# Patient Record
Sex: MALE | Race: WHITE | NOT HISPANIC OR LATINO | Employment: PART TIME | URBAN - METROPOLITAN AREA
[De-identification: names, ages, dates, MRNs, and addresses within clinical notes are randomized per-mention and may not be internally consistent; named-entity substitution may affect disease eponyms.]

---

## 2021-11-10 ENCOUNTER — OFFICE VISIT (OUTPATIENT)
Dept: INTERNAL MEDICINE | Facility: CLINIC | Age: 76
End: 2021-11-10
Payer: COMMERCIAL

## 2021-11-10 ENCOUNTER — NURSE TRIAGE (OUTPATIENT)
Dept: INTERNAL MEDICINE | Facility: CLINIC | Age: 76
End: 2021-11-10

## 2021-11-10 VITALS
TEMPERATURE: 97.9 F | BODY MASS INDEX: 25.4 KG/M2 | DIASTOLIC BLOOD PRESSURE: 68 MMHG | HEIGHT: 70 IN | HEART RATE: 85 BPM | OXYGEN SATURATION: 96 % | SYSTOLIC BLOOD PRESSURE: 148 MMHG | WEIGHT: 177.4 LBS | RESPIRATION RATE: 16 BRPM

## 2021-11-10 DIAGNOSIS — M79.622 PAIN OF LEFT UPPER ARM: ICD-10-CM

## 2021-11-10 DIAGNOSIS — G62.9 NEUROPATHY: Primary | ICD-10-CM

## 2021-11-10 DIAGNOSIS — M75.101 ROTATOR CUFF SYNDROME, RIGHT: ICD-10-CM

## 2021-11-10 PROCEDURE — 99203 OFFICE O/P NEW LOW 30 MIN: CPT | Performed by: NURSE PRACTITIONER

## 2021-11-10 RX ORDER — ACETAMINOPHEN 500 MG
500 TABLET ORAL 2 TIMES DAILY
Status: ON HOLD | COMMUNITY
End: 2022-02-02

## 2021-11-10 RX ORDER — GABAPENTIN 300 MG/1
300 CAPSULE ORAL AT BEDTIME
Qty: 30 CAPSULE | Refills: 1 | Status: SHIPPED | OUTPATIENT
Start: 2021-11-10 | End: 2021-12-30

## 2021-11-10 ASSESSMENT — MIFFLIN-ST. JEOR: SCORE: 1550.93

## 2021-11-10 NOTE — TELEPHONE ENCOUNTER
"Nurse Triage SBAR    Is this a 2nd Level Triage? NO    Situation: Worsening numbness, tingling, pain to left arm, wrist, and hand. Hand spasms.     Background: Patient reports 4 weeks of numbness, tingling, pain to left arm, wrist, and hand. [facility] told [facility] \"see how it does after Thanksgiving.\" Patient was given pain medications which he reports are \"worthless.\" Patient states he is taking a \"minor narcotic.\" He does not know the name of it. He also states he is taking Tylenol and Tramadol. He does not know the dose of these.     Last winter had an injury that affected his left arm. Around February 2021.    Patient reports upcoming surgery on right arm, December 8th, \"replacement surgery for my shoulder.\" Patient expressed concern about how he will manage after this surgery if he has this pain in his left arm.    Assessment: Numbness and tingling down left arm, wrist, left hand. Sore and painful. Sore when moving arm. Started 4 weeks ago, but got worse last night.- 9-10/10 pain overnight. Now better.     Denies dizziness, headaches, vision changes, or chest pain/discomort. Denies swelling, redness, or heat on the left arm, hand, wrist.   Patient reports he is still able to use his left hand and arm, but it feels weaker than the right hand \"because of the pain.\"       (See information below for more triage details.)    Recommendation:   Protocol Recommended Disposition: Urgent office follow-up appointment      Appointments in Next Year    Nov 10, 2021  5:30 PM  (Arrive by 5:10 PM)  Office Visit with Katie Dumont CNP  [facility] ([facility] ) [phone]        Advised patient to call back or go to urgent care/ED if worsens today before appointment - such as loss of sensation, unable to move arm, loss of strength, any neuro changes such as headache, vision changes, or any chest pain. Patient verbalized " "understanding.      Reason for Disposition    Hand or wrist pain is the main symptom    Weakness (i.e., loss of strength) of new onset in hand or fingers (Exception: not truly weak, hand feels weak because of pain)     Pain, numbness, tingling for the past 4 weeks. Worsening last night.    Additional Information    Negative: Chest pain    Negative: Wound looks infected    Negative: Elbow pain is the main symptom    Negative: Shock suspected (e.g., cold/pale/clammy skin, too weak to stand, low BP, rapid pulse)    Negative: Similar pain previously and it was from 'heart attack'    Negative: Similar pain previously from 'angina' and not relieved by nitroglycerin    Negative: Sounds like a life-threatening emergency to the triager    Negative: Followed an injury to arm    Negative: Chest pain lasting longer than 5 minutes    Negative: Difficulty breathing or unusual sweating (e.g., sweating without exertion)    Negative: Similar pain previously and it was from 'heart attack'    Negative: Similar pain previously from 'angina' and not relieved by nitroglycerin    Negative: Sounds like a life-threatening emergency to the triager    Negative: Followed a hand or wrist injury    Negative: Chest pain    Negative: Caused by an animal bite    Negative: Wound looks infected    Negative: Fever and red area (or area very tender to touch)    Negative: Fever and swollen joint    Negative: Patient sounds very sick or weak to the triager    Negative: SEVERE pain (e.g., excruciating, unable to use hand at all)    Negative: Red area or streak and large (> 2 in or 5 cm)    Answer Assessment - Initial Assessment Questions  1. ONSET: \"When did the pain start?\"     4 weeks ago  2. LOCATION: \"Where is the pain located?\"      Left arm, down to wrist, and hand  3. PAIN: \"How bad is the pain?\" (Scale 1-10; or mild, moderate, severe)    - MILD (1-3): doesn't interfere with normal activities    - MODERATE (4-7): interferes with normal activities " "(e.g., work or school) or awakens from sleep    - SEVERE (8-10): excruciating pain, unable to do any normal activities, unable to hold a cup of water      9-10/10 last night in the left hand especially  4. WORK OR EXERCISE: \"Has there been any recent work or exercise that involved this part of the body?\"      Patient reports injury last winter in February that affected his left arm.   5. CAUSE: \"What do you think is causing the arm pain?\"      Patient thinks it's a compressed nerve.   6. OTHER SYMPTOMS: \"Do you have any other symptoms?\" (e.g., neck pain, swelling, rash, fever, numbness, weakness)      Numbness, tingling, pain. Denies swelling or redness. Patient reports his left hand is weaker.   7. PREGNANCY: \"Is there any chance you are pregnant?\" \"When was your last menstrual period?\"      N/A    Protocols used: ARM PAIN-A-OH, HAND AND WRIST PAIN-A-OH    Radha BAIRES RN   [facility]    "

## 2021-11-10 NOTE — PROGRESS NOTES
"  Assessment & Plan     Neuropathy with pain down left arm  - will treat with medication:  - gabapentin (NEURONTIN) 300 MG capsule; Take 1 capsule (300 mg) by mouth At Bedtime  - already has Tramadol, Tylenol, and Meloxicam for pain  - [facility]  - suggested trying left wrist splint to wear at night to see if helps      Rotator cuff syndrome, right  - scheduled [facility] with [facility] P [facility] with [facility]         BMI:   Estimated body mass index is 25.45 kg/m  as calculated from the following:    Height as of this encounter: 1.778 m (5' 10\").    Weight as of this encounter: 80.5 kg (177 lb 6.4 oz).       Patient Instructions   Encouraged to [facility] with the [facility] for [facility] and numbness; will treat with Gabapentin 300 mg daily at night to help with pain in left arm and numbness in fingers.    Wearing a wrist splint on the left side at bedtime may help    Already has Tramadol and Meloxicam.      Return in about 4 weeks (around 12/8/2021), or if symptoms worsen or fail to improve.    AMELIA Yu [facility] EFRAIN Mcintosh is a 76 year old who presents for the following health issues  accompanied by his self.    HPI     He complains of having pain in his shoulders, arms, and hands.    New patient to the clinic.  Briefly reviewed PMH, SH, FH, and medications.  [facility].     Complaining of left shoulder and arm pain x 4 weeks and has not been able to sleep past few days. Radiates down his left arm and his fingers will go numb.  Hx of neck pain and thinks he was on that medication in the past for it which helped.  Scheduled for right shoulder [facility] on [facility]21 [facility].  States he was worked up for left shoulder also 2 weeks ago with some \"tears\" but not as severe as right.  [facility] the [facility] on [facility] for all of " "this + a pre-op next week.  Has Tramadol and Meloxicam for the pain. Today, basically wanting something for sleep; tried Melatonin until he can be seen by the [facility].    No fever or cough.  No shortness of breathe or chest pain.  No stomach or urination issues.    Review of Systems   Constitutional, HEENT, cardiovascular, pulmonary, GI, , musculoskeletal, neuro, skin, endocrine and psych systems are negative, except as otherwise noted.      Objective    BP (!) 160/68 (BP Location: Right arm, Patient Position: Sitting, Cuff Size: Adult Large)   Pulse 85   Temp 97.9  F (36.6  C) (Tympanic)   Resp 16   Ht 1.778 m (5' 10\")   Wt 80.5 kg (177 lb 6.4 oz)   SpO2 96%   BMI 25.45 kg/m    Body mass index is 25.45 kg/m .     Physical Exam   GENERAL:  alert and no distress  RESP: lungs clear to auscultation - no rales, rhonchi or wheezes  CV: regular rate and rhythm, normal S1 S2, no S3 or S4, no murmur, click or rub, no peripheral edema and peripheral pulses strong  MS: no gross musculoskeletal defects noted, no edema  SHOULDER:  Very limited ROM in bilateral shoulders through rotator cuff muscles with downward weakness; decreased strength in left hand/fingers that curl up   SKIN:  No rashes or erythema              "

## 2021-11-11 ENCOUNTER — TELEPHONE (OUTPATIENT)
Dept: INTERNAL MEDICINE | Facility: CLINIC | Age: 76
End: 2021-11-11
Payer: COMMERCIAL

## 2021-11-11 NOTE — TELEPHONE ENCOUNTER
Agree that it may take some time to help the nerve pain in his left arm.  No to increased dose.  [facility] with [facility] who [facility] and treating him for bilateral shoulder and neck pain.  He also has Tramadol that he can alternate with Tylenol along with Meloxicam.

## 2021-11-11 NOTE — TELEPHONE ENCOUNTER
Patient was seen in clinic late yesterday for neuropathy pain left upper arm.  He took his first dose of Gabapentin 300 mg last night at bedtime and did not get any relief.  He is asking if he can take 2 pills tonight.  Advised patient that Katie Dumont is not in the clinic today and that he should give the medication time to work at current dose and that a message would be forwarded to provider to view upon her return.  States he has shooting pains in left arm and he can get no relief and sits up all night watching TV because he is unable to sleep. TRINIDAD Bradford R.N.

## 2021-11-11 NOTE — PATIENT INSTRUCTIONS
[facility]ouraged to [facility] and [facility]ness; will treat with Gabapentin 300 mg daily at night to help with pain in left arm and numbness in fingers.    Wearing a wrist splint on the left side at bedtime may help    Already has Tramadol and Meloxicam.

## 2021-11-13 ENCOUNTER — NURSE TRIAGE (OUTPATIENT)
Dept: NURSING | Facility: CLINIC | Age: 76
End: 2021-11-13
Payer: COMMERCIAL

## 2021-11-13 NOTE — CONFIDENTIAL NOTE
Patient called back as he was left a voice mail that he has a message.  FNA relyed message to patient from Katie Dumont CNP and patient agrees.    Ania Fields RN/FNA

## 2021-12-29 DIAGNOSIS — M79.622 PAIN OF LEFT UPPER ARM: ICD-10-CM

## 2021-12-29 DIAGNOSIS — G62.9 NEUROPATHY: ICD-10-CM

## 2021-12-29 NOTE — TELEPHONE ENCOUNTER
Routing refill request to provider for review/approval because:  Drug not on the [facility] refill protocol     Pending Prescriptions:                       Disp   Refills    gabapentin (NEURONTIN) 300 MG capsule [Pha*30 cap*1        Sig: TAKE 1 CAPSULE(300 MG) BY MOUTH AT BEDTIME

## 2021-12-30 RX ORDER — GABAPENTIN 300 MG/1
CAPSULE ORAL
Qty: 30 CAPSULE | Refills: 1 | Status: SHIPPED | OUTPATIENT
Start: 2021-12-30 | End: 2022-03-04

## 2022-01-21 DIAGNOSIS — Z11.59 ENCOUNTER FOR SCREENING FOR OTHER VIRAL DISEASES: Primary | ICD-10-CM

## 2022-01-29 ENCOUNTER — LAB (OUTPATIENT)
Dept: URGENT CARE | Facility: URGENT CARE | Age: 77
End: 2022-01-29
Attending: ORTHOPAEDIC SURGERY
Payer: COMMERCIAL

## 2022-01-29 DIAGNOSIS — Z11.59 ENCOUNTER FOR SCREENING FOR OTHER VIRAL DISEASES: ICD-10-CM

## 2022-01-29 PROCEDURE — U0003 INFECTIOUS AGENT DETECTION BY NUCLEIC ACID (DNA OR RNA); SEVERE ACUTE RESPIRATORY SYNDROME CORONAVIRUS 2 (SARS-COV-2) (CORONAVIRUS DISEASE [COVID-19]), AMPLIFIED PROBE TECHNIQUE, MAKING USE OF HIGH THROUGHPUT TECHNOLOGIES AS DESCRIBED BY CMS-2020-01-R: HCPCS

## 2022-01-29 PROCEDURE — U0005 INFEC AGEN DETEC AMPLI PROBE: HCPCS

## 2022-01-30 LAB — SARS-COV-2 RNA RESP QL NAA+PROBE: NEGATIVE

## 2022-02-01 ENCOUNTER — ANESTHESIA EVENT (OUTPATIENT)
Dept: SURGERY | Facility: CLINIC | Age: 77
End: 2022-02-01
Payer: COMMERCIAL

## 2022-02-01 RX ORDER — ATORVASTATIN CALCIUM 40 MG/1
20 TABLET, FILM COATED ORAL DAILY
COMMUNITY

## 2022-02-01 RX ORDER — ASPIRIN 81 MG/1
81 TABLET ORAL EVERY MORNING
Status: ON HOLD | COMMUNITY
End: 2022-02-02

## 2022-02-01 RX ORDER — MULTIPLE VITAMINS W/ MINERALS TAB 9MG-400MCG
1 TAB ORAL DAILY
COMMUNITY

## 2022-02-01 RX ORDER — SODIUM PHOSPHATE,MONO-DIBASIC 19G-7G/118
1 ENEMA (ML) RECTAL EVERY MORNING
COMMUNITY
End: 2024-10-03

## 2022-02-01 NOTE — PROGRESS NOTES
PTA medications updated by Medication Scribe prior to surgery via phone call with patient (last doses completed by Nurse)     Medication history sources: Patient, Surescripts, H&P and Patient's home med list  In the past week, patient estimated taking medication this percent of the time: Greater than 90%  Adherence assessment: N/A Not Observed    Significant changes made to the medication list:  None      Additional medication history information:   None    Medication reconciliation completed by provider prior to medication history? No    Time spent in this activity: 30 MINUTES    The information provided in this note is only as accurate as the sources available at the time of update(s)      Prior to Admission medications    Medication Sig Last Dose Taking? Auth Provider   acetaminophen (TYLENOL) 500 MG tablet Take 500 mg by mouth 2 times daily   at PRN Yes Reported, Patient   aspirin 81 MG EC tablet Take 81 mg by mouth every morning  at AM Yes Reported, Patient   atorvastatin (LIPITOR) 40 MG tablet Take 20 mg by mouth daily (40MG X 0.5 = 20MG)  at PM Yes Reported, Patient   diphenhydrAMINE-acetaminophen (TYLENOL PM)  MG tablet Take 1 tablet by mouth nightly as needed for sleep  at PM Yes Reported, Patient   gabapentin (NEURONTIN) 300 MG capsule TAKE 1 CAPSULE(300 MG) BY MOUTH AT BEDTIME  at PM Yes Laina Sosa NP   glucosamine-chondroitin 500-400 MG CAPS per capsule Take 1 capsule by mouth every morning  at AM Yes Reported, Patient   melatonin 5 MG tablet Take 5 mg by mouth nightly as needed for sleep  at PRN Yes Reported, Patient   multivitamin w/minerals (MULTI-VITAMIN) tablet Take 1 tablet by mouth daily  at AM Yes Reported, Patient

## 2022-02-02 ENCOUNTER — HOSPITAL ENCOUNTER (OUTPATIENT)
Facility: CLINIC | Age: 77
Discharge: HOME | End: 2022-02-03
Attending: ORTHOPAEDIC SURGERY | Admitting: ORTHOPAEDIC SURGERY
Payer: COMMERCIAL

## 2022-02-02 ENCOUNTER — APPOINTMENT (OUTPATIENT)
Dept: GENERAL RADIOLOGY | Facility: CLINIC | Age: 77
End: 2022-02-02
Attending: PHYSICIAN ASSISTANT
Payer: COMMERCIAL

## 2022-02-02 ENCOUNTER — ANESTHESIA (OUTPATIENT)
Dept: SURGERY | Facility: CLINIC | Age: 77
End: 2022-02-02
Payer: COMMERCIAL

## 2022-02-02 DIAGNOSIS — Z96.611 S/P REVERSE TOTAL SHOULDER ARTHROPLASTY, RIGHT: Primary | ICD-10-CM

## 2022-02-02 PROBLEM — Z96.619 S/P REVERSE TOTAL SHOULDER ARTHROPLASTY: Status: ACTIVE | Noted: 2022-02-02

## 2022-02-02 PROCEDURE — 250N000009 HC RX 250: Performed by: ORTHOPAEDIC SURGERY

## 2022-02-02 PROCEDURE — 250N000009 HC RX 250: Performed by: ANESTHESIOLOGY

## 2022-02-02 PROCEDURE — 360N000077 HC SURGERY LEVEL 4, PER MIN: Performed by: ORTHOPAEDIC SURGERY

## 2022-02-02 PROCEDURE — 370N000017 HC ANESTHESIA TECHNICAL FEE, PER MIN: Performed by: ORTHOPAEDIC SURGERY

## 2022-02-02 PROCEDURE — 258N000001 HC RX 258: Performed by: ORTHOPAEDIC SURGERY

## 2022-02-02 PROCEDURE — 272N000001 HC OR GENERAL SUPPLY STERILE: Performed by: ORTHOPAEDIC SURGERY

## 2022-02-02 PROCEDURE — 258N000003 HC RX IP 258 OP 636: Performed by: ANESTHESIOLOGY

## 2022-02-02 PROCEDURE — 250N000013 HC RX MED GY IP 250 OP 250 PS 637: Performed by: PHYSICIAN ASSISTANT

## 2022-02-02 PROCEDURE — 258N000003 HC RX IP 258 OP 636: Performed by: PHYSICIAN ASSISTANT

## 2022-02-02 PROCEDURE — 250N000025 HC SEVOFLURANE, PER MIN: Performed by: ORTHOPAEDIC SURGERY

## 2022-02-02 PROCEDURE — 999N000063 XR SHOULDER RIGHT PORT G/E 2 VIEWS: Mod: RT

## 2022-02-02 PROCEDURE — 250N000011 HC RX IP 250 OP 636: Performed by: ANESTHESIOLOGY

## 2022-02-02 PROCEDURE — C1713 ANCHOR/SCREW BN/BN,TIS/BN: HCPCS | Performed by: ORTHOPAEDIC SURGERY

## 2022-02-02 PROCEDURE — C1776 JOINT DEVICE (IMPLANTABLE): HCPCS | Performed by: ORTHOPAEDIC SURGERY

## 2022-02-02 PROCEDURE — 250N000011 HC RX IP 250 OP 636: Performed by: PHYSICIAN ASSISTANT

## 2022-02-02 PROCEDURE — 999N000141 HC STATISTIC PRE-PROCEDURE NURSING ASSESSMENT: Performed by: ORTHOPAEDIC SURGERY

## 2022-02-02 PROCEDURE — 710N000010 HC RECOVERY PHASE 1, LEVEL 2, PER MIN: Performed by: ORTHOPAEDIC SURGERY

## 2022-02-02 DEVICE — SCREW PERIPHERAL 5.0X34MM DWJ334: Type: IMPLANTABLE DEVICE | Site: SHOULDER | Status: FUNCTIONAL

## 2022-02-02 DEVICE — IMPLANTABLE DEVICE
Type: IMPLANTABLE DEVICE | Site: SHOULDER | Status: FUNCTIONAL
Brand: AEQUALIS™ PERFORM+ REVERSED

## 2022-02-02 DEVICE — SCREW PERIPHERAL 22MM: Type: IMPLANTABLE DEVICE | Site: SHOULDER | Status: FUNCTIONAL

## 2022-02-02 DEVICE — IMPLANTABLE DEVICE
Type: IMPLANTABLE DEVICE | Site: SHOULDER | Status: FUNCTIONAL
Brand: FLEX SHOULDER SYSTEM

## 2022-02-02 DEVICE — IMPLANTABLE DEVICE
Type: IMPLANTABLE DEVICE | Site: SHOULDER | Status: FUNCTIONAL
Brand: AEQUALIS™ ASCEND™ FLEX

## 2022-02-02 DEVICE — SCREW PERIPHERAL 5.0X42MM DWJ342: Type: IMPLANTABLE DEVICE | Site: SHOULDER | Status: FUNCTIONAL

## 2022-02-02 DEVICE — SCREW CENTRAL 6.5X30MM DWJ130: Type: IMPLANTABLE DEVICE | Site: SHOULDER | Status: FUNCTIONAL

## 2022-02-02 RX ORDER — FENTANYL CITRATE 0.05 MG/ML
50 INJECTION, SOLUTION INTRAMUSCULAR; INTRAVENOUS
Status: DISCONTINUED | OUTPATIENT
Start: 2022-02-02 | End: 2022-02-02 | Stop reason: HOSPADM

## 2022-02-02 RX ORDER — NALOXONE HYDROCHLORIDE 0.4 MG/ML
0.4 INJECTION, SOLUTION INTRAMUSCULAR; INTRAVENOUS; SUBCUTANEOUS
Status: DISCONTINUED | OUTPATIENT
Start: 2022-02-02 | End: 2022-02-03 | Stop reason: HOSPADM

## 2022-02-02 RX ORDER — OXYCODONE HYDROCHLORIDE 5 MG/1
5-10 TABLET ORAL EVERY 4 HOURS PRN
Qty: 30 TABLET | Refills: 0 | Status: SHIPPED | OUTPATIENT
Start: 2022-02-02 | End: 2024-10-03

## 2022-02-02 RX ORDER — PROCHLORPERAZINE MALEATE 5 MG
5 TABLET ORAL EVERY 6 HOURS PRN
Status: DISCONTINUED | OUTPATIENT
Start: 2022-02-02 | End: 2022-02-03 | Stop reason: HOSPADM

## 2022-02-02 RX ORDER — SODIUM CHLORIDE, SODIUM LACTATE, POTASSIUM CHLORIDE, CALCIUM CHLORIDE 600; 310; 30; 20 MG/100ML; MG/100ML; MG/100ML; MG/100ML
INJECTION, SOLUTION INTRAVENOUS CONTINUOUS
Status: DISCONTINUED | OUTPATIENT
Start: 2022-02-02 | End: 2022-02-03 | Stop reason: HOSPADM

## 2022-02-02 RX ORDER — LIDOCAINE 40 MG/G
CREAM TOPICAL
Status: DISCONTINUED | OUTPATIENT
Start: 2022-02-02 | End: 2022-02-03 | Stop reason: HOSPADM

## 2022-02-02 RX ORDER — FENTANYL CITRATE 0.05 MG/ML
25 INJECTION, SOLUTION INTRAMUSCULAR; INTRAVENOUS EVERY 5 MIN PRN
Status: DISCONTINUED | OUTPATIENT
Start: 2022-02-02 | End: 2022-02-02 | Stop reason: HOSPADM

## 2022-02-02 RX ORDER — SODIUM CHLORIDE, SODIUM LACTATE, POTASSIUM CHLORIDE, CALCIUM CHLORIDE 600; 310; 30; 20 MG/100ML; MG/100ML; MG/100ML; MG/100ML
INJECTION, SOLUTION INTRAVENOUS CONTINUOUS
Status: DISCONTINUED | OUTPATIENT
Start: 2022-02-02 | End: 2022-02-02 | Stop reason: HOSPADM

## 2022-02-02 RX ORDER — CEFAZOLIN SODIUM/WATER 2 G/20 ML
2 SYRINGE (ML) INTRAVENOUS
Status: DISCONTINUED | OUTPATIENT
Start: 2022-02-02 | End: 2022-02-02 | Stop reason: HOSPADM

## 2022-02-02 RX ORDER — ONDANSETRON 2 MG/ML
INJECTION INTRAMUSCULAR; INTRAVENOUS PRN
Status: DISCONTINUED | OUTPATIENT
Start: 2022-02-02 | End: 2022-02-02

## 2022-02-02 RX ORDER — OXYCODONE HYDROCHLORIDE 5 MG/1
5 TABLET ORAL EVERY 4 HOURS PRN
Status: DISCONTINUED | OUTPATIENT
Start: 2022-02-02 | End: 2022-02-02 | Stop reason: HOSPADM

## 2022-02-02 RX ORDER — ONDANSETRON 2 MG/ML
4 INJECTION INTRAMUSCULAR; INTRAVENOUS EVERY 30 MIN PRN
Status: DISCONTINUED | OUTPATIENT
Start: 2022-02-02 | End: 2022-02-02 | Stop reason: HOSPADM

## 2022-02-02 RX ORDER — CEFAZOLIN SODIUM/WATER 2 G/20 ML
2 SYRINGE (ML) INTRAVENOUS SEE ADMIN INSTRUCTIONS
Status: DISCONTINUED | OUTPATIENT
Start: 2022-02-02 | End: 2022-02-02 | Stop reason: HOSPADM

## 2022-02-02 RX ORDER — HYDROMORPHONE HCL IN WATER/PF 6 MG/30 ML
0.4 PATIENT CONTROLLED ANALGESIA SYRINGE INTRAVENOUS
Status: DISCONTINUED | OUTPATIENT
Start: 2022-02-02 | End: 2022-02-03 | Stop reason: HOSPADM

## 2022-02-02 RX ORDER — FENTANYL CITRATE 50 UG/ML
INJECTION, SOLUTION INTRAMUSCULAR; INTRAVENOUS PRN
Status: DISCONTINUED | OUTPATIENT
Start: 2022-02-02 | End: 2022-02-02

## 2022-02-02 RX ORDER — ACETAMINOPHEN 325 MG/1
975 TABLET ORAL EVERY 8 HOURS
Status: DISCONTINUED | OUTPATIENT
Start: 2022-02-02 | End: 2022-02-03 | Stop reason: HOSPADM

## 2022-02-02 RX ORDER — ONDANSETRON 2 MG/ML
4 INJECTION INTRAMUSCULAR; INTRAVENOUS EVERY 6 HOURS PRN
Status: DISCONTINUED | OUTPATIENT
Start: 2022-02-02 | End: 2022-02-03 | Stop reason: HOSPADM

## 2022-02-02 RX ORDER — NALOXONE HYDROCHLORIDE 0.4 MG/ML
0.2 INJECTION, SOLUTION INTRAMUSCULAR; INTRAVENOUS; SUBCUTANEOUS
Status: DISCONTINUED | OUTPATIENT
Start: 2022-02-02 | End: 2022-02-03 | Stop reason: HOSPADM

## 2022-02-02 RX ORDER — EPHEDRINE SULFATE 50 MG/ML
INJECTION, SOLUTION INTRAMUSCULAR; INTRAVENOUS; SUBCUTANEOUS PRN
Status: DISCONTINUED | OUTPATIENT
Start: 2022-02-02 | End: 2022-02-02

## 2022-02-02 RX ORDER — BUPIVACAINE HYDROCHLORIDE AND EPINEPHRINE 2.5; 5 MG/ML; UG/ML
INJECTION, SOLUTION INFILTRATION; PERINEURAL PRN
Status: DISCONTINUED | OUTPATIENT
Start: 2022-02-02 | End: 2022-02-02 | Stop reason: HOSPADM

## 2022-02-02 RX ORDER — AMOXICILLIN 250 MG
1-2 CAPSULE ORAL 2 TIMES DAILY
Qty: 30 TABLET | Refills: 0 | Status: SHIPPED | OUTPATIENT
Start: 2022-02-02 | End: 2024-10-03

## 2022-02-02 RX ORDER — GLYCOPYRROLATE 0.2 MG/ML
INJECTION, SOLUTION INTRAMUSCULAR; INTRAVENOUS PRN
Status: DISCONTINUED | OUTPATIENT
Start: 2022-02-02 | End: 2022-02-02

## 2022-02-02 RX ORDER — BISACODYL 10 MG
10 SUPPOSITORY, RECTAL RECTAL DAILY PRN
Status: DISCONTINUED | OUTPATIENT
Start: 2022-02-02 | End: 2022-02-03 | Stop reason: HOSPADM

## 2022-02-02 RX ORDER — OXYCODONE HYDROCHLORIDE 5 MG/1
10 TABLET ORAL EVERY 4 HOURS PRN
Status: DISCONTINUED | OUTPATIENT
Start: 2022-02-02 | End: 2022-02-03 | Stop reason: HOSPADM

## 2022-02-02 RX ORDER — DEXAMETHASONE SODIUM PHOSPHATE 4 MG/ML
INJECTION, SOLUTION INTRA-ARTICULAR; INTRALESIONAL; INTRAMUSCULAR; INTRAVENOUS; SOFT TISSUE PRN
Status: DISCONTINUED | OUTPATIENT
Start: 2022-02-02 | End: 2022-02-02

## 2022-02-02 RX ORDER — MAGNESIUM HYDROXIDE 1200 MG/15ML
LIQUID ORAL PRN
Status: DISCONTINUED | OUTPATIENT
Start: 2022-02-02 | End: 2022-02-02 | Stop reason: HOSPADM

## 2022-02-02 RX ORDER — HYDROMORPHONE HCL IN WATER/PF 6 MG/30 ML
0.2 PATIENT CONTROLLED ANALGESIA SYRINGE INTRAVENOUS EVERY 5 MIN PRN
Status: DISCONTINUED | OUTPATIENT
Start: 2022-02-02 | End: 2022-02-02 | Stop reason: HOSPADM

## 2022-02-02 RX ORDER — POLYETHYLENE GLYCOL 3350 17 G/17G
17 POWDER, FOR SOLUTION ORAL DAILY
Status: DISCONTINUED | OUTPATIENT
Start: 2022-02-03 | End: 2022-02-03 | Stop reason: HOSPADM

## 2022-02-02 RX ORDER — ACETAMINOPHEN 325 MG/1
650 TABLET ORAL EVERY 4 HOURS PRN
Status: DISCONTINUED | OUTPATIENT
Start: 2022-02-05 | End: 2022-02-03 | Stop reason: HOSPADM

## 2022-02-02 RX ORDER — ACETAMINOPHEN 325 MG/1
650 TABLET ORAL EVERY 4 HOURS PRN
Qty: 100 TABLET | Refills: 0 | Status: SHIPPED | OUTPATIENT
Start: 2022-02-02 | End: 2024-10-03

## 2022-02-02 RX ORDER — HYDROMORPHONE HCL IN WATER/PF 6 MG/30 ML
0.2 PATIENT CONTROLLED ANALGESIA SYRINGE INTRAVENOUS
Status: DISCONTINUED | OUTPATIENT
Start: 2022-02-02 | End: 2022-02-03 | Stop reason: HOSPADM

## 2022-02-02 RX ORDER — CEFAZOLIN SODIUM 2 G/100ML
2 INJECTION, SOLUTION INTRAVENOUS EVERY 8 HOURS
Status: COMPLETED | OUTPATIENT
Start: 2022-02-02 | End: 2022-02-03

## 2022-02-02 RX ORDER — PROPOFOL 10 MG/ML
INJECTION, EMULSION INTRAVENOUS PRN
Status: DISCONTINUED | OUTPATIENT
Start: 2022-02-02 | End: 2022-02-02

## 2022-02-02 RX ORDER — ONDANSETRON 4 MG/1
4 TABLET, ORALLY DISINTEGRATING ORAL EVERY 30 MIN PRN
Status: DISCONTINUED | OUTPATIENT
Start: 2022-02-02 | End: 2022-02-02 | Stop reason: HOSPADM

## 2022-02-02 RX ORDER — NEOSTIGMINE METHYLSULFATE 1 MG/ML
VIAL (ML) INJECTION PRN
Status: DISCONTINUED | OUTPATIENT
Start: 2022-02-02 | End: 2022-02-02

## 2022-02-02 RX ORDER — TRANEXAMIC ACID 650 MG/1
1950 TABLET ORAL ONCE
Status: DISCONTINUED | OUTPATIENT
Start: 2022-02-02 | End: 2022-02-02 | Stop reason: HOSPADM

## 2022-02-02 RX ORDER — LIDOCAINE HYDROCHLORIDE 20 MG/ML
INJECTION, SOLUTION INFILTRATION; PERINEURAL PRN
Status: DISCONTINUED | OUTPATIENT
Start: 2022-02-02 | End: 2022-02-02

## 2022-02-02 RX ORDER — ASPIRIN 81 MG/1
81 TABLET ORAL 2 TIMES DAILY
Qty: 60 TABLET | Refills: 0 | Status: SHIPPED | OUTPATIENT
Start: 2022-02-02

## 2022-02-02 RX ORDER — AMOXICILLIN 250 MG
1 CAPSULE ORAL 2 TIMES DAILY
Status: DISCONTINUED | OUTPATIENT
Start: 2022-02-02 | End: 2022-02-03 | Stop reason: HOSPADM

## 2022-02-02 RX ORDER — ONDANSETRON 4 MG/1
4 TABLET, ORALLY DISINTEGRATING ORAL EVERY 6 HOURS PRN
Status: DISCONTINUED | OUTPATIENT
Start: 2022-02-02 | End: 2022-02-03 | Stop reason: HOSPADM

## 2022-02-02 RX ORDER — OXYCODONE HYDROCHLORIDE 5 MG/1
5 TABLET ORAL EVERY 4 HOURS PRN
Status: DISCONTINUED | OUTPATIENT
Start: 2022-02-02 | End: 2022-02-03 | Stop reason: HOSPADM

## 2022-02-02 RX ADMIN — Medication 10 MG: at 11:24

## 2022-02-02 RX ADMIN — FENTANYL CITRATE 25 MCG: 50 INJECTION, SOLUTION INTRAMUSCULAR; INTRAVENOUS at 13:41

## 2022-02-02 RX ADMIN — PHENYLEPHRINE HYDROCHLORIDE 0.3 MCG/KG/MIN: 10 INJECTION INTRAVENOUS at 11:09

## 2022-02-02 RX ADMIN — TRANEXAMIC ACID 1 G: 1 INJECTION, SOLUTION INTRAVENOUS at 12:23

## 2022-02-02 RX ADMIN — SODIUM CHLORIDE, POTASSIUM CHLORIDE, SODIUM LACTATE AND CALCIUM CHLORIDE: 600; 310; 30; 20 INJECTION, SOLUTION INTRAVENOUS at 09:01

## 2022-02-02 RX ADMIN — FENTANYL CITRATE 50 MCG: 50 INJECTION, SOLUTION INTRAMUSCULAR; INTRAVENOUS at 12:38

## 2022-02-02 RX ADMIN — TRANEXAMIC ACID 1 G: 1 INJECTION, SOLUTION INTRAVENOUS at 11:04

## 2022-02-02 RX ADMIN — NEOSTIGMINE METHYLSULFATE 4 MG: 1 INJECTION, SOLUTION INTRAVENOUS at 12:24

## 2022-02-02 RX ADMIN — ONDANSETRON 4 MG: 2 INJECTION INTRAMUSCULAR; INTRAVENOUS at 12:25

## 2022-02-02 RX ADMIN — PHENYLEPHRINE HYDROCHLORIDE 200 MCG: 10 INJECTION INTRAVENOUS at 11:21

## 2022-02-02 RX ADMIN — ACETAMINOPHEN 975 MG: 325 TABLET, FILM COATED ORAL at 16:32

## 2022-02-02 RX ADMIN — HYDROMORPHONE HYDROCHLORIDE 0.2 MG: 0.2 INJECTION, SOLUTION INTRAMUSCULAR; INTRAVENOUS; SUBCUTANEOUS at 14:22

## 2022-02-02 RX ADMIN — Medication 5 MG: at 11:40

## 2022-02-02 RX ADMIN — SODIUM CHLORIDE, POTASSIUM CHLORIDE, SODIUM LACTATE AND CALCIUM CHLORIDE: 600; 310; 30; 20 INJECTION, SOLUTION INTRAVENOUS at 12:31

## 2022-02-02 RX ADMIN — ROCURONIUM BROMIDE 50 MG: 50 INJECTION, SOLUTION INTRAVENOUS at 10:48

## 2022-02-02 RX ADMIN — FENTANYL CITRATE 50 MCG: 50 INJECTION, SOLUTION INTRAMUSCULAR; INTRAVENOUS at 12:04

## 2022-02-02 RX ADMIN — FENTANYL CITRATE 50 MCG: 50 INJECTION, SOLUTION INTRAMUSCULAR; INTRAVENOUS at 11:13

## 2022-02-02 RX ADMIN — MIDAZOLAM 2 MG: 1 INJECTION INTRAMUSCULAR; INTRAVENOUS at 09:47

## 2022-02-02 RX ADMIN — PROPOFOL 150 MG: 10 INJECTION, EMULSION INTRAVENOUS at 10:48

## 2022-02-02 RX ADMIN — ACETAMINOPHEN 975 MG: 325 TABLET, FILM COATED ORAL at 23:08

## 2022-02-02 RX ADMIN — CEFAZOLIN SODIUM 2 G: 2 INJECTION, SOLUTION INTRAVENOUS at 20:15

## 2022-02-02 RX ADMIN — Medication 2 G: at 10:55

## 2022-02-02 RX ADMIN — DEXAMETHASONE SODIUM PHOSPHATE 4 MG: 4 INJECTION, SOLUTION INTRA-ARTICULAR; INTRALESIONAL; INTRAMUSCULAR; INTRAVENOUS; SOFT TISSUE at 11:06

## 2022-02-02 RX ADMIN — LIDOCAINE HYDROCHLORIDE 100 MG: 20 INJECTION, SOLUTION INFILTRATION; PERINEURAL at 10:48

## 2022-02-02 RX ADMIN — PHENYLEPHRINE HYDROCHLORIDE 100 MCG: 10 INJECTION INTRAVENOUS at 11:09

## 2022-02-02 RX ADMIN — BUPIVACAINE HYDROCHLORIDE 15 ML: 5 INJECTION, SOLUTION PERINEURAL at 10:26

## 2022-02-02 RX ADMIN — FENTANYL CITRATE 50 MCG: 50 INJECTION INTRAMUSCULAR; INTRAVENOUS at 10:08

## 2022-02-02 RX ADMIN — GLYCOPYRROLATE 0.8 MG: 0.2 INJECTION, SOLUTION INTRAMUSCULAR; INTRAVENOUS at 12:22

## 2022-02-02 RX ADMIN — Medication 5 MG: at 11:57

## 2022-02-02 RX ADMIN — FENTANYL CITRATE 50 MCG: 50 INJECTION, SOLUTION INTRAMUSCULAR; INTRAVENOUS at 10:48

## 2022-02-02 RX ADMIN — SODIUM CHLORIDE, POTASSIUM CHLORIDE, SODIUM LACTATE AND CALCIUM CHLORIDE: 600; 310; 30; 20 INJECTION, SOLUTION INTRAVENOUS at 16:32

## 2022-02-02 RX ADMIN — FENTANYL CITRATE 25 MCG: 50 INJECTION, SOLUTION INTRAMUSCULAR; INTRAVENOUS at 13:48

## 2022-02-02 ASSESSMENT — LIFESTYLE VARIABLES: TOBACCO_USE: 0

## 2022-02-02 ASSESSMENT — MIFFLIN-ST. JEOR: SCORE: 1562.72

## 2022-02-02 ASSESSMENT — COPD QUESTIONNAIRES: COPD: 0

## 2022-02-02 NOTE — ANESTHESIA PROCEDURE NOTES
Other (axillary approach) Procedure Note    Pre-Procedure   Staff -        Anesthesiologist:  Miguel Souza MD       Performed By: anesthesiologist       Location: pre-op       Pre-Anesthestic Checklist: patient identified, IV checked, site marked, risks and benefits discussed, informed consent, monitors and equipment checked, pre-op evaluation, at physician/surgeon's request and post-op pain management  Timeout:       Correct Patient: Yes        Correct Procedure: Yes        Correct Site: Yes        Correct Position: Yes        Correct Laterality: Yes        Site Marked: Yes  Procedure Documentation  Procedure: Other (axillary approach)       Diagnosis: SUPRASCAPULAR NERVE BLOCK       Laterality: right       Patient Position: supine       Patient Prep/Sterile Barriers: sterile gloves, mask       Skin prep: Chloraprep      Local skin infiltrated with mL of 1% lidocaine.        Needle Type: insulated and short bevel       Needle Gauge: 21.        Needle Length (millimeters): 100        Ultrasound guided       1. Ultrasound was used to identify targeted nerve, plexus, vascular marker, or fascial plane and place a needle adjacent to it in real-time.       2. Ultrasound was used to visualize the spread of anesthetic in close proximity to the above referenced structure.       3. A permanent image is entered into the patient's record.       4. The visualized anatomic structures appeared normal.       5. There were no apparent abnormal pathologic findings.    Assessment/Narrative         The placement was negative for: blood aspirated, painful injection and site bleeding       Paresthesias: No.     Bolus given via needle..        Secured via.        Insertion/Infusion Method: Single Shot       Complications: none       Injection made incrementally with aspirations every 5 mL.    Medication(s) Administered   Bupivacaine 0.5% w/ 1:400K Epi (Injection), 15 mL  Medication Administration Time: 2/2/2022 10:26  Thank you for choosing [facility].     It was a pleasure to see you today.      Providers:       [place]:    MD Lisa Martins MD Eric Bomberg MD Sandy Chen Liu, MD Bradley Miller MD PhD      [facility]    Care Coordinators (non urgent calls) Mon- Fri:  Telma Aparicio MS RN  [phone]   Lesley Malloy RN, CPN  [phone]     Care Coordinator fax: [phone]  Growth Hormone: Judy Keys, [place]   [phone]     Please leave a message on one line only. Calls will be returned as soon as possible once your physician has reviewed the results or questions.   Medication renewal requests must be faxed to the main office by your pharmacy.  Allow 3-4 days for completion.   Fax: [phone]    Mailing Address:  Pediatric Endocrinology  Academic Office [place] [phone]    Test results may be available via Flowboard prior to your provider reviewing them. Your provider will review results as soon as possible once all labs are resulted.   Abnormal results will be communicated to you via [facility], telephone call or letter.  Please allow 2 -3 weeks for processing/interpretation of most lab work.  If you live in the [facility] area and need labs, we request that the labs be done at an [facility] facility.  [facility] locations are listed on the [email or web address] website. Please call that site for a lab time.   For urgent issues that cannot wait until the next business day, call [phone] and ask for the Pediatric Endocrinologist on call.    Scheduling:    Pediatric Call Center: [phone] for [facility] - 3rd floor [facility] [place] 9th floor [facility] [phone] (for stimulation tests)  Radiology/ [phone]   Services:   [phone]     Please sign up for Flowboard for easy and HIPAA compliant confidential  AM           communication.  Sign up at the clinic  or go to [email or web address]   Patients must be seen in clinic annually to continue to receive prescriptions and test results.   Patients on growth hormone must be seen twice yearly.     COVID-19 Recommendations: Pediatric Endocrinology  The Division of Endocrinology at the [facility] encourages our patients to receive vaccination against the SARS CoV2 virus that causes COVID-19. At this time, the only vaccine approved in children is the Pfizer vaccine for children 12 years or older. If you are 12 years or older, we encourage you to receive the first vaccine that is available to you.   Please go to [email or web address] to register to receive your vaccine at an [facility] location.  Once you are registered, you will be contacted to schedule an appointment when vaccine is available.   Please go to [email or web address] to register to receive your vaccine through the [facility]'s Vaccine Connector portal. You will be contacted to schedule an appointment when vaccine is available.  You can also register to receive the vaccine from a local pharmacy.  As vaccines receive Emergency Use Authorization or Approval by the FDA for younger ages, we recommend that all children with endocrine disorders receive the vaccine unless there is an allergy to the vaccine or its ingredients. Children receiving endocrine medications such as growth hormone, hydrocortisone or levothyroxine are still eligible to receive the vaccination.   If you would like to get your child tested for COVID-19, please go to [email or web address] for information about [facility] testing locations.    Your child has been seen in the Pediatric Endocrinology Specialty Clinic.  Our goal is to co-manage your child's medical care along with their primary care  physician.  We manage care needs related to the endocrine diagnosis but primary care issues including preventative care or acute illness visits, COVID concerns, camp forms, etc must be managed by your local primary care physician.  Please inform our coordinators if the patient has any emergency department visits or hospitalizations related to their endocrine diagnosis.      Please refer to the CDC and state department of health websites for information regarding precautions surrounding COVID-19.  At this time, there is no evidence to suggest that your child's endocrine diagnosis increases risk for yosi COVID-19.  This is an ongoing area of research, however,and we will update you as further research becomes available.

## 2022-02-02 NOTE — ANESTHESIA PROCEDURE NOTES
Other (axillary approach) Procedure Note    Pre-Procedure   Staff -        Anesthesiologist:  Miguel Souza MD       Performed By: anesthesiologist       Location: pre-op       Pre-Anesthestic Checklist: patient identified, IV checked, site marked, risks and benefits discussed, informed consent, monitors and equipment checked, pre-op evaluation, at physician/surgeon's request and post-op pain management  Timeout:       Correct Patient: Yes        Correct Procedure: Yes        Correct Site: Yes        Correct Position: Yes        Correct Laterality: Yes        Site Marked: Yes  Procedure Documentation  Procedure: Other (axillary approach)       Diagnosis: AXILLARY NERVE BLOCK       Laterality: right       Patient Position: supine       Patient Prep/Sterile Barriers: sterile gloves, mask       Skin prep: Chloraprep      Local skin infiltrated with mL of 1% lidocaine.        Needle Type: insulated and short bevel       Needle Gauge: 21.        Needle Length (millimeters): 100        Ultrasound guided       1. Ultrasound was used to identify targeted nerve, plexus, vascular marker, or fascial plane and place a needle adjacent to it in real-time.       2. Ultrasound was used to visualize the spread of anesthetic in close proximity to the above referenced structure.       3. A permanent image is entered into the patient's record.       4. The visualized anatomic structures appeared normal.       5. There were no apparent abnormal pathologic findings.    Assessment/Narrative         The placement was negative for: blood aspirated, painful injection and site bleeding       Paresthesias: No.     Bolus given via needle..        Secured via.        Insertion/Infusion Method: Single Shot       Complications: none    Medication(s) Administered   Bupivacaine 0.5% w/ 1:400K Epi (Injection), 8 mL  Medication Administration Time: 2/2/2022 10:27 AM

## 2022-02-02 NOTE — OP NOTE
Procedure Date: 02/02/2022    PREOPERATIVE DIAGNOSES:  Right shoulder rotator cuff tear arthropathy with massive rotator cuff tear.    POSTOPERATIVE DIAGNOSES:  Right shoulder rotator cuff tear arthropathy with massive rotator cuff tear.    PROCEDURE PERFORMED:  Right reverse total shoulder arthroplasty.    SURGEON:  Evangelista Oquendo MD    FIRST ASSISTANT:  GERARDO Lopez.  A skilled first assistant was necessary for patient positioning, prepping and draping, help with soft tissue retraction, help with arm positioning, reduction maneuvers, closing and patient transfer.    ANESTHESIA:  General, block and local.    COMPLICATIONS:  None apparent.    ESTIMATED BLOOD LOSS:  200 mL.    IMPLANTS:  1.  Tornier Aequalis Perform plus reversed full wedge augmented baseplate, size 29.  2.  Tornier Aequalis Perform reversed standard glenosphere, size 39.  3.  Tornier Flex shoulder system reversed insert polyethylene, size +6.  4.  Tornier Aequalis Ascend Flex standard PTC humeral stem size 6B.  5.  Tornier Flex shoulder system, reversed tray, eccentricity low, thickness +0.    INDICATIONS FOR PROCEDURE:  The patient is a 76-year-old male who has a known massive irreparable rotator cuff tear on the right.  [facility]  Unfortunately, he was unable to get in for a reverse shoulder arthroplasty, so was referred to me.  After discussion of treatment options with the patient, we decided the best way to move forward would be a reverse shoulder arthroplasty given his massive irreparable rotator cuff tear.  He agreed to proceed.    DESCRIPTION OF PROCEDURE:  On day of the procedure, the patient was met in the preoperative area by the Surgery and Anesthesia team.  The right shoulder was marked by the operative surgeon.  Informed consent was obtained.  Risks and benefits of the surgery were discussed with the patient including risk of bleeding, infection, damage to neurovascular structures, stiffness, continued pain  and need for future revision surgery.  He understood and agreed to proceed.    Our Anesthesia colleagues then performed a block.  He was then brought to the operating room and general anesthesia was administered.  He was then placed in the beach chair position.  The right shoulder was prepped and draped in the usual sterile fashion.  Preoperative antibiotics given and preoperative timeout was performed.    We began the procedure by making a standard deltopectoral incision.  Sharp dissection was carried down through the skin and subcutaneous tissue.  The cephalic vein was visualized and mobilized laterally and the deltopectoral interval was developed.  Subacromial and subdeltoid adhesions were mobilized.  The subscapularis was torn and was scarred and the supraspinatus and infraspinatus were completely torn as well.  The patient had basically a bare humeral head.  We mobilized the lateral aspect of the conjoined tendon, coagulated the 3 sisters inferiorly.  The biceps was previously cut and therefore was not present.  We then tagged the subscap, peeled the subscap off the lesser tuberosity and released the capsule inferiorly all the way to the 7 o'clock position.  Again, the humeral head was completely bare.  We then placed a Fukuda retractor and did a 4-sided subscapularis release and tucked it into the subscapularis fossa.  We then turned our attention to the proximal humerus.  I made an anatomic neck cut using freehand technique and used sounders as well as broaches to work our way up to a size 6B stem.  Cut protector was then placed.  It should be noted that part of the teres minor did appear intact and was left alone.  We then turned our attention back to the glenoid.  The labrum was circumferentially removed and the capsule inferiorly was completely removed.  There was a relatively large glenoid.  We then used a Preciado elevator in order to remove the cartilage down to subchondral bone and then used a full wedge  guide, with the full wedge being superior in order to place our pin into the glenoid.  This was then overdrilled and over-reamed and a size 29 full wedge augmented baseplate was then screwed into position with 3 peripheral screws for added stability.  We then trialed multiple glenospheres, including 39 and 42, and noted that a 39 would be appropriate; therefore, a standard 39 glenosphere was impacted into position.  We then turned our attention back to the proximal humerus.  We noted that a low eccentricity tray was appropriate.  The trial implants were then removed and the true implants were impacted together on the back table.  We then placed 3 drill holes about the lesser tuberosity for subscapularis repair.  The subscap would be repaired in the end using NiceLoop technique.  The true implants were then impacted into position after the wound was copiously irrigated and the shoulder was reduced.  Excellent range of motion.  No signs of impingement.  We then performed a tuberoplasty around the greater tuberosity to prevent impingement as well.  We then repaired the subscap to the lesser tuberosity using three #2 FiberWires in NiceLoop fashion.  The wound was then copiously irrigated.  Local anesthetic was injected into the periarticular soft tissue.  Vancomycin powder was placed into the wound and the deltopectoral interval was loosely reapproximated using #1 Vicryl, followed by standard closure of the skin.  Sterile dressings were applied.  The patient was placed into an UltraSling, awakened from anesthesia and brought to the PACU for recovery.  Postoperatively, he will remain in the sling for a total of 3 weeks.  We will initiate physical therapy in the hospital and will be discharged home on postop day 1.    Evangelista Oquendo MD        D: 2022   T: 2022   MT: [id number]    Name:     ISAAC FOWLER  MRN:      [id number]        Account:        [id number]   :      1945           Procedure Date:  02/02/2022     Document: [id number]

## 2022-02-02 NOTE — BRIEF OP NOTE
[facility]    Brief Operative Note    Pre-operative diagnosis: Rotator cuff arthropathy of right shoulder [M12.811]  Post-operative diagnosis Same as pre-operative diagnosis    Procedure: Procedure(s):  RIGHT REVERSE TOTAL SHOULDER  Surgeon: Surgeon(s) and Role:     * Evangelista Oquendo MD - Primary     * Monica Pacheco PA-C - Assisting  Anesthesia: Combined General with Block   Estimated Blood Loss: None    Drains: None  Specimens: * No specimens in log *  Findings:   None.  Complications: None.  Implants:   Implant Name Type Inv. Item Serial No.  Lot No. LRB No. Used Action   Aequalis PerFORM+ Reversed: Full-wedge augment baseplate   [id number] TORNIER  Right 1 Implanted   SCREW CENTRAL 6.5X30MM [id number] - [id number] Metallic Hardware/[place] SCREW CENTRAL 6.5X30MM [id number]  [employer] 41 07 01 FEB 2022 Right 1 Implanted   SCREW PERIPHERAL 5.0X42MM [id number] - [id number] Metallic Hardware/[place] SCREW PERIPHERAL 5.0X42MM [id number]  [employer]N 41 07 01 FEB 2022 Right 1 Implanted   SCREW PERIPHERAL 5.0X34MM [id number] - [id number] Metallic Hardware/[place] SCREW PERIPHERAL 5.0X34MM [id number]  [employer] 41 07 01 FEB 2022 Right 1 Implanted   SCREW PERIPHERAL 22MM - [id number] Metallic Hardware/[place] SCREW PERIPHERAL 22MM  [employer] 41 07 01 FEB 2022 Right 1 Implanted   Aequalis PerFORM Reversed Standard Glenosphere: 39mm   [id number] [employer]ER [employer]  Right 1 Implanted   INSERT REVISION REVERSE +6/39MM [id number] - [id number] Total Joint Component/Insert INSERT REVISION REVERSE +6/39MM [id number] [employer]N  Right 1 Implanted   STEM HUMERAL ANATOMIC STD PTC 6B - [id number] Total Joint Component/Insert STEM HUMERAL ANATOMIC STD PTC 6B [id number] [employer]ER [employer]  Right 1 Implanted   TRAY REVERSE LOW OFFSET +0 [id number] - [id number] Total Joint Component/Insert TRAY REVERSE LOW OFFSET +0 [id number] [employer]ER [employer]  Right 1 Implanted     Plan:  NWB to CHRISTINEE  Abrahamling x 3  weeks  DVT prophylaxis - SCDs & Lovenox, then discharge on ASA EC 81 mg PO BID x 4 weeks   Ancef x 24 hours  PACU XRs  PT/OT - Codman's and pendulum swing exercises. Okay to work on PROM; FF 0 - 90, ER 0 - 30.   Keep dressing C/D/I for 1 week; okay to shower    Follow up with Fernanda Pacheco PA-C in clinic in 2 weeks.

## 2022-02-02 NOTE — ANESTHESIA PREPROCEDURE EVALUATION
Anesthesia Pre-Procedure Evaluation    Patient: Arnaldo Pacheco   MRN: [id number] : 1945        Preoperative Diagnosis: Rotator cuff arthropathy of right shoulder [M12.811]    Procedure : Procedure(s):  RIGHT REVERSE TOTAL SHOULDER          Past Medical History:   Diagnosis Date     Chronic pain of both shoulders     rotator cuff with right shoulder replacement scheduled for 2021     Chronic pain of left knee     s/p left total knee replacement     Gastroesophageal reflux disease      Hyperlipidemia LDL goal <130      SSS (sick sinus syndrome) (H)       Past Surgical History:   Procedure Laterality Date     [facility]      No Known Allergies   Social History     Tobacco Use     Smoking status: Never Smoker     Smokeless tobacco: Never Used   Substance Use Topics     Alcohol use: Yes     Comment: weekly      Wt Readings from Last 1 Encounters:   22 81.6 kg (180 lb)        Anesthesia Evaluation   Pt has had prior anesthetic. Type: General.    No history of anesthetic complications       ROS/MED HX  ENT/Pulmonary:    (-) tobacco use, asthma, COPD and sleep apnea   Neurologic:     (+) peripheral neuropathy, - hand weakness bilateral, probably cervical etiology.     Cardiovascular: Comment: Sick sinus syndrome    (+) Dyslipidemia hypertension-----    METS/Exercise Tolerance: >4 METS    Hematologic:  - neg hematologic  ROS     Musculoskeletal:       GI/Hepatic:     (+) GERD,  (-) liver disease   Renal/Genitourinary:    (-) renal disease   Endo:    (-) Type I DM and Type II DM   Psychiatric/Substance Use:       Infectious Disease:       Malignancy:       Other:            Physical Exam    Airway        Mallampati: II   TM distance: > 3 FB   Neck ROM: full   Mouth opening: > 3 cm    Respiratory Devices and Support         Dental         B=Bridge, C=Chipped, L=Loose, M=Missing    Cardiovascular          Rhythm and rate: regular and normal     Pulmonary   pulmonary exam  normal                OUTSIDE LABS:  CBC: No results found for: WBC, HGB, HCT, PLT  BMP: No results found for: NA, POTASSIUM, CHLORIDE, CO2, BUN, CR, GLC  COAGS: No results found for: PTT, INR, FIBR  POC: No results found for: BGM, HCG, HCGS  HEPATIC: No results found for: ALBUMIN, PROTTOTAL, ALT, AST, GGT, ALKPHOS, BILITOTAL, BILIDIRECT, CEDRICK  OTHER: No results found for: PH, LACT, A1C, SUNITHA, PHOS, MAG, LIPASE, AMYLASE, TSH, T4, T3, CRP, SED    Anesthesia Plan    ASA Status:  2   NPO Status:  NPO Appropriate    Anesthesia Type: General.     - Airway: ETT   Induction: Intravenous.   Maintenance: Balanced.        Consents    Anesthesia Plan(s) and associated risks, benefits, and realistic alternatives discussed. Questions answered and patient/representative(s) expressed understanding.    - Discussed:     - Discussed with:  Patient      - Extended Intubation/Ventilatory Support Discussed: No.      - Patient is DNR/DNI Status: No    Use of blood products discussed: No .     Postoperative Care    Pain management: Peripheral nerve block (Single Shot).   PONV prophylaxis: Ondansetron (or other 5HT-3), Dexamethasone or Solumedrol     Comments:           H&P reviewed: Unable to attach H&P to encounter due to EHR limitations. H&P Update: appropriate H&P reviewed, patient examined, interval changes since H&P (within 30 days) include: BUE numbness and weakness in hands, responsive to steroid burst         Miguel Souza MD

## 2022-02-02 NOTE — ANESTHESIA PROCEDURE NOTES
Airway       Patient location during procedure: OR       Procedure Start/Stop Times: 2/2/2022 10:51 AM  Staff -        CRNA: Judit Mckeon APRN CRNA       Performed By: CRNAIndications and Patient Condition       Indications for airway management: lalo-procedural       Induction type:intravenous       Mask difficulty assessment: 1 - vent by mask    Final Airway Details       Final airway type: endotracheal airway       Successful airway: ETT - single  Endotracheal Airway Details        ETT size (mm): 8.0       Cuffed: yes       Successful intubation technique: direct laryngoscopy       DL Blade Type: Ceron 2       Grade View of Cords: 1       Adjucts: stylet       Position: Left       Measured from: lips       Secured at (cm): 23       Bite block used: None    Post intubation assessment        Placement verified by: capnometry, equal breath sounds and chest rise        Number of attempts at approach: 1       Secured with: pink tape       Ease of procedure: easy       Dentition: Intact and Unchanged

## 2022-02-02 NOTE — ANESTHESIA CARE TRANSFER NOTE
Patient: Arnaldo Pacheco    Procedure: Procedure(s):  RIGHT REVERSE TOTAL SHOULDER       Diagnosis: Rotator cuff arthropathy of right shoulder [M12.811]  Diagnosis Additional Information: No value filed.    Anesthesia Type:   General     Note:    Oropharynx: oropharynx clear of all foreign objects and spontaneously breathing  Level of Consciousness: awake  Oxygen Supplementation: face mask    Independent Airway: airway patency satisfactory and stable  Dentition: dentition unchanged  Vital Signs Stable: post-procedure vital signs reviewed and stable  Report to RN Given: handoff report given  Patient transferred to: PACU    Handoff Report: Identifed the Patient, Identified the Reponsible Provider, Reviewed the pertinent medical history, Discussed the surgical course, Reviewed Intra-OP anesthesia mangement and issues during anesthesia, Set expectations for post-procedure period and Allowed opportunity for questions and acknowledgement of understanding      Vitals:  Vitals Value Taken Time   /65 02/02/22 1257   Temp     Pulse 89 02/02/22 1303   Resp 32 02/02/22 1303   SpO2 98 % 02/02/22 1303   Vitals shown include unvalidated device data.    Electronically Signed By: MARY Adame CRNA  February 2, 2022  1:05 PM

## 2022-02-03 ENCOUNTER — APPOINTMENT (OUTPATIENT)
Dept: OCCUPATIONAL THERAPY | Facility: CLINIC | Age: 77
End: 2022-02-03
Attending: ORTHOPAEDIC SURGERY
Payer: COMMERCIAL

## 2022-02-03 VITALS
OXYGEN SATURATION: 97 % | HEIGHT: 70 IN | RESPIRATION RATE: 16 BRPM | WEIGHT: 180 LBS | BODY MASS INDEX: 25.77 KG/M2 | HEART RATE: 68 BPM | SYSTOLIC BLOOD PRESSURE: 136 MMHG | DIASTOLIC BLOOD PRESSURE: 63 MMHG | TEMPERATURE: 97.8 F

## 2022-02-03 LAB
CREAT SERPL-MCNC: 0.72 MG/DL (ref 0.66–1.25)
FASTING STATUS PATIENT QL REPORTED: NO
GFR SERPL CREATININE-BSD FRML MDRD: >90 ML/MIN/1.73M2
GLUCOSE BLD-MCNC: 135 MG/DL (ref 70–99)
HGB BLD-MCNC: 11.6 G/DL (ref 13.3–17.7)
PLATELET # BLD AUTO: 238 10E3/UL (ref 150–450)

## 2022-02-03 PROCEDURE — 97535 SELF CARE MNGMENT TRAINING: CPT | Mod: GO | Performed by: OCCUPATIONAL THERAPIST

## 2022-02-03 PROCEDURE — 85049 AUTOMATED PLATELET COUNT: CPT | Performed by: ORTHOPAEDIC SURGERY

## 2022-02-03 PROCEDURE — 258N000003 HC RX IP 258 OP 636: Performed by: PHYSICIAN ASSISTANT

## 2022-02-03 PROCEDURE — 97166 OT EVAL MOD COMPLEX 45 MIN: CPT | Mod: GO | Performed by: OCCUPATIONAL THERAPIST

## 2022-02-03 PROCEDURE — 250N000013 HC RX MED GY IP 250 OP 250 PS 637: Performed by: PHYSICIAN ASSISTANT

## 2022-02-03 PROCEDURE — 82947 ASSAY GLUCOSE BLOOD QUANT: CPT | Performed by: ORTHOPAEDIC SURGERY

## 2022-02-03 PROCEDURE — 97110 THERAPEUTIC EXERCISES: CPT | Mod: GO | Performed by: OCCUPATIONAL THERAPIST

## 2022-02-03 PROCEDURE — 82565 ASSAY OF CREATININE: CPT | Performed by: ORTHOPAEDIC SURGERY

## 2022-02-03 PROCEDURE — 36415 COLL VENOUS BLD VENIPUNCTURE: CPT | Performed by: PHYSICIAN ASSISTANT

## 2022-02-03 PROCEDURE — 97530 THERAPEUTIC ACTIVITIES: CPT | Mod: GO | Performed by: OCCUPATIONAL THERAPIST

## 2022-02-03 PROCEDURE — 250N000011 HC RX IP 250 OP 636: Performed by: PHYSICIAN ASSISTANT

## 2022-02-03 PROCEDURE — 96372 THER/PROPH/DIAG INJ SC/IM: CPT | Performed by: PHYSICIAN ASSISTANT

## 2022-02-03 PROCEDURE — 85018 HEMOGLOBIN: CPT | Performed by: PHYSICIAN ASSISTANT

## 2022-02-03 RX ORDER — TAMSULOSIN HYDROCHLORIDE 0.4 MG/1
0.4 CAPSULE ORAL DAILY
Qty: 10 CAPSULE | Refills: 0 | Status: SHIPPED | OUTPATIENT
Start: 2022-02-03 | End: 2024-10-03

## 2022-02-03 RX ADMIN — OXYCODONE HYDROCHLORIDE 5 MG: 5 TABLET ORAL at 12:41

## 2022-02-03 RX ADMIN — CEFAZOLIN SODIUM 2 G: 2 INJECTION, SOLUTION INTRAVENOUS at 03:53

## 2022-02-03 RX ADMIN — ACETAMINOPHEN 975 MG: 325 TABLET, FILM COATED ORAL at 06:43

## 2022-02-03 RX ADMIN — ENOXAPARIN SODIUM 40 MG: 40 INJECTION SUBCUTANEOUS at 12:42

## 2022-02-03 RX ADMIN — SENNOSIDES AND DOCUSATE SODIUM 1 TABLET: 50; 8.6 TABLET ORAL at 08:40

## 2022-02-03 RX ADMIN — SODIUM CHLORIDE, POTASSIUM CHLORIDE, SODIUM LACTATE AND CALCIUM CHLORIDE: 600; 310; 30; 20 INJECTION, SOLUTION INTRAVENOUS at 00:56

## 2022-02-03 RX ADMIN — POLYETHYLENE GLYCOL 3350 17 G: 17 POWDER, FOR SOLUTION ORAL at 08:40

## 2022-02-03 RX ADMIN — ACETAMINOPHEN 975 MG: 325 TABLET, FILM COATED ORAL at 14:46

## 2022-02-03 RX ADMIN — OXYCODONE HYDROCHLORIDE 5 MG: 5 TABLET ORAL at 00:07

## 2022-02-03 RX ADMIN — OXYCODONE HYDROCHLORIDE 5 MG: 5 TABLET ORAL at 13:28

## 2022-02-03 ASSESSMENT — ACTIVITIES OF DAILY LIVING (ADL): PREVIOUS_RESPONSIBILITIES: MEAL PREP;HOUSEKEEPING;LAUNDRY;SHOPPING;MEDICATION MANAGEMENT;FINANCES;DRIVING;WORK

## 2022-02-03 NOTE — PROGRESS NOTES
02/03/22 1109   Quick Adds   Type of Visit Initial Occupational Therapy Evaluation   Living Environment   People in home other (see comments)  (has a renter)   Current Living Arrangements house   Living Environment Comments Pt lives in split level home, has a renter.  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   Disability/Function   Number of times patient has fallen within last six months 1   Change in Functional Status Since Onset of Current Illness/Injury yes   General Information   Onset of Illness/Injury or Date of Surgery 02/02/22   Referring Physician Monica Amin   Patient/Family Therapy Goal Statement (OT) home   Additional Occupational Profile Info/Pertinent History of Current Problem s/p R Reverse TSA   Existing Precautions/Restrictions fall;shoulder   Right Upper Extremity (Weight-bearing Status) non weight-bearing (NWB)   General Observations and Info Activity order: ambulate with assist 3x daily   Cognitive Status Examination   Cognitive Status Comments no cognitive concerns   Sensory   Sensory Comments B hand neuropathy at baseline   Pain Assessment   Patient Currently in Pain Yes, see Vital Sign flowsheet   Integumentary/Edema   Integumentary/Edema Comments typical post-op mild increased swelling RUE; post-op dressing on surgical site   Posture   Posture protracted shoulders   Range of Motion Comprehensive   Comment, General Range of Motion LUE WFL; RUE NT   Strength Comprehensive  (MMT)   Comment, General Manual Muscle Testing (MMT) Assessment LUE generally 4/5; RUE NT   Coordination   Functional Limitations Impaired ability to perform bilateral tasks   Coordination Comments B hand neuropathy   Bed Mobility   Bed Mobility supine-sit;sit-supine;rolling left   Rolling Left [place] (Bed Mobility) contact guard;verbal cues   Supine-Sit [place] (Bed Mobility) contact guard;verbal cues   Sit-Supine [place] (Bed Mobility) contact guard;verbal cues   Comment (Bed Mobility) HOB flat   Transfers   Transfers sit-stand transfer;toilet transfer;shower transfer   Sit-Stand Transfer   Sit-Stand [place] (Transfers) contact guard;supervision   Shower Transfer   Type (Shower Transfer) lateral   Toilet Transfer   Type (Toilet Transfer) sit-stand;stand-sit   [place] Level (Toilet Transfer) contact guard;verbal cues   Assistive Device (Toilet Transfer) grab bars/safety frame   Toilet Transfer Comments pt has partial wall on L side of toilet, can use to A with transfer   Balance   Balance Comments good seated; unsteady ambulating in room, pt reports recent knee pain and also old TKA not completely successful   Activities of Daily Living   BADL Assessment upper body dressing;lower body dressing;toileting   Upper Body Dressing Assessment   [place] Level (Upper Body Dressing) maximum assist (25% patient effort)   Lower Body Dressing Assessment   [place] Level (Lower Body Dressing) moderate assist (50% patient effort)   Toileting   [place] Level (Toileting) independent   Clinical Impression   Criteria for Skilled Therapeutic Interventions Met (OT) yes;meets criteria;skilled treatment is necessary   OT Diagnosis impaired ADLs   OT Problem List-Impairments impacting ADL problems related to;activity tolerance impaired;balance;coordination;range of motion (ROM);sensation;strength;pain;post-surgical precautions   Assessment of Occupational Performance 3-5 Performance Deficits    Identified Performance Deficits dressing, bathing, toileting, bed mobility   Planned Therapy Interventions (OT) ADL retraining;bed mobility training;orthoic fitting/training;ROM;strengthening;stretching;transfer training   Clinical Decision Making Complexity (OT) moderate complexity   Therapy Frequency (OT) 1x eval and treat   Predicted Duration of Therapy 1 day   Risk & Benefits of therapy have been explained evaluation/treatment results reviewed;care plan/treatment goals reviewed;risks/benefits reviewed;participants voiced agreement with care plan;participants included;patient   OT Discharge Planning    OT Discharge Recommendation (DC Rec)   (defer to ortho MD)   OT Rationale for DC Rec Anticipate pt will be Ara dressing/sling, Ara toilet transfer, Ara shower transfer, A for IADLs   OT Brief overview of current status  Ara sling/dressing, Ara toilet tx, Ara shower tx, SBA ambulation 50'x2, Ara stairs x8, Ara bed mobility, Ara HEP   Therapy Certification   Start of Care Date 02/03/22   Certification date from 02/03/22   Certification date to 02/03/22   Medical Diagnosis Reverse TSA   Total Evaluation Time (Minutes)   Total Evaluation Time (Minutes) 10

## 2022-02-03 NOTE — PROGRESS NOTES
CURBSIDE CONSULT    Paged by Jazmin Howe PA-C regarding urinary retention.  Noted low urine output this morning with minimal urine present on bladder scan.  After encouraged PO fluid patient has been urinating much better but post-void bladder scan with ~ 600 ml present.    - Agree with discharging with Flomax at night.    - Advised straight cath before discharging to empty bladder and asked that actual amount be documented in the chart.    - Advise patient to present to the ED if he has difficulty urinating, develops abdominal pain or distention.      Elias Hough PA-C

## 2022-02-03 NOTE — PLAN OF CARE
Occupational Therapy Discharge Summary    Reason for therapy discharge:    All goals and outcomes met, no further needs identified.    Progress towards therapy goal(s). See goals on Care Plan in [facility] electronic health record for goal details.  Goals met    Therapy recommendation(s):    Continue home exercise program.

## 2022-02-03 NOTE — PROGRESS NOTES
Patient vital signs are at baseline: Yes  Patient able to ambulate as they were prior to admission or with assist devices provided by therapies during their stay:  Yes  Patient MUST void prior to discharge:  Yes.  Was voiding in BR.  Was straight cath'd prior to discharge d/t retention.  Discharged w/ Flomax.  Patient able to tolerate oral intake:  Yes  Pain has adequate pain control using Oral analgesics:  Yes    Reviewed discharge instructions and medications with patient:YES  Questions answered:YES  Patient discharged to:Home w/ Family(Ex-spouse)  All belongs discharged with patient:YES

## 2022-02-03 NOTE — PLAN OF CARE
[facility]      OUTPATIENT OCCUPATIONAL THERAPY  EVALUATION  PLAN OF TREATMENT FOR OUTPATIENT REHABILITATION  (COMPLETE FOR INITIAL CLAIMS ONLY)  Patient's Last Name, First Name, M.I.  YOB: 1945  PachecoArnaldo GARCIA                          Provider's Name  [facility] Medical Record No.  [id number]                               Onset Date:  02/02/22   Start of Care Date:  02/03/22     Type:     ___PT   _X_OT   ___SLP Medical Diagnosis:  Reverse TSA                        OT Diagnosis:  impaired ADLs   Visits from SOC:  1   _________________________________________________________________________________  Plan of Treatment/Functional Goals    Planned Interventions: ADL retraining,bed mobility training,orthoic fitting/training,ROM,strengthening,stretching,transfer training   Goals: See Occupational Therapy Goals on Care Plan in [facility] electronic health record.    Therapy Frequency: 1x eval and treat  Predicted Duration of Therapy Intervention: 1 day  _________________________________________________________________________________    I CERTIFY THE NEED FOR THESE SERVICES FURNISHED UNDER        THIS PLAN OF TREATMENT AND WHILE UNDER MY CARE     (Physician co-signature of this document indicates review and certification of the therapy plan).              Certification date from: 02/03/22, Certification date to: 02/03/22    Referring Physician: Monica Amin            Initial Assessment        See Occupational Therapy evaluation dated 02/03/22 in Epic electronic health record.

## 2022-02-03 NOTE — PLAN OF CARE
Patient vital signs are at baseline: Yes  Patient able to ambulate as they were prior to admission or with assist devices provided by therapies during their stay:  Yes, SBA  Patient MUST void prior to discharge:  No,  Reason:  DTV  Patient able to tolerate oral intake:  Yes  Pain has adequate pain control using Oral analgesics:  Yes, scheduled tylenol, PRN oxycodone.     Pt A&O x4, CMS intact- baseline numbness bilateral hands. IV saline lock. VSS on RA. Bladder scan 0430 for 279mL. Dressing CDI. Continue to monitor.

## 2022-02-03 NOTE — DISCHARGE INSTRUCTIONS
Urinary Retention (Male)  Urinary retention is when you have trouble urinating. In some cases you may not be able to pass any urine at all. This condition occurs even though your bladder is full.   Causes  The most common cause of urinary retention in men is the bladder outlet being blocked. This can be due to an enlarged prostate gland or a bladder infection. Some medicines can also cause this problem. This condition is more likely to occur as men get older.   Symptoms  Common symptoms include:    Pain (not everyone has this)    Frequent urination    Feeling that the bladder is still full after urinating    Not being able to control the release of urine (incontinence)    Swollen belly (abdomen)  Treatment  This condition is treated by putting a tube (catheter) into the bladder to drain the urine. This gives relief right away. The catheter may need to stay in place for a few days. The catheter has a balloon on the tip. This is inflated after the catheter is put in the bladder. This prevents the catheter from falling out.     Home care    If you were given antibiotics, take them until they are used up, or your healthcare provider tells you to stop. It's important to finish the antibiotics even if you feel better. This is to make sure your infection has cleared.    If a catheter was left in place, it's important to keep bacteria from getting into the collection bag. Don't disconnect the catheter from the collection bag.    Use a leg band to secure the drainage tube, so it does not pull on the catheter. Drain the collection bag when it becomes full using the drain spout at the bottom of the bag.    Don't pull on or try to take out your catheter. This will harm your urethra. The catheter must be removed by a healthcare provider.    Follow-up care  Follow up with your healthcare provider, or as advised.  If a catheter was left in place, it can often be removed in 3 to 7 days. Some conditions require the catheter to  stay in longer. Your provider will tell you when to come back to have the catheter removed.   When to get medical advice  Call your healthcare provider right away if any of these occur:    Fever of 100.4 F (38 C) or higher, or as directed by your provider    Bladder or lower-belly pain or fullness    Belly swelling, nausea, vomiting, or back pain    Blood or urine leakage around the catheter    Bloody urine coming from the catheter (if a new symptom)    Weakness, dizziness, or fainting    Confusion or change in normal level of alertness    If a catheter was left in place, see your provider if the catheter:  ? Falls out  ? Stops draining for 6 hours  Kalli last reviewed this educational content on 1/1/2020 2000-2021 [employer]. All rights reserved. This information is not intended as a substitute for professional medical care. Always follow your healthcare professional's instructions.

## 2022-02-03 NOTE — PROGRESS NOTES
"ORTHOPEDIC UPPER EXTREMITY PROGRESS NOTE    POD# 1  Patient is a 76 year old male who underwent Procedure(s):  RIGHT REVERSE TOTAL SHOULDER on 2022. Pain is well controlled. Tolerating medication well, no nausea or vomiting.  Voiding well.  Discharge instructions reviewed.    Vitals:   Blood pressure 133/65, pulse 68, temperature 97.5  F (36.4  C), temperature source Oral, resp. rate 12, height 1.778 m (5' 10\"), weight 81.6 kg (180 lb), SpO2 96 %.  Temp (24hrs), Av.8  F (36.6  C), Min:97.5  F (36.4  C), Max:98.2  F (36.8  C)      EXAM   The patient is awake and alert.   Sensation is intact.  Digital Flexion/Extension maintained.   Brisk cap refill.   The incision is covered.    Labs: No lab results found.    ASSESSMENT  S/p R rev TSA   PLAN  1. DVT prophylaxis: Lovenox while in the hospital and then ASA upon discharge  2. Weight Bearing NWB (Non weight bearing).  3. Wound Care leave undisturbed  4. Discharge anticipated date today Home with No Skilled Service  5. Cont Pain Control Oxycodone and Tylenol  6. Continue with shoulder immobilizer.  Okay to remove for ADLs.  Okay for active range of motion for elbow, wrist and digits.    Leonie Howe PA-C  [facility]      "

## 2022-02-03 NOTE — PROGRESS NOTES
Patient vital signs are at baseline: Yes  Patient able to ambulate as they were prior to admission or with assist devices provided by therapies during their stay:  No,  Reason:  Not OOB yet.   Patient MUST void prior to discharge:  No,  Reason:  DTV  Patient able to tolerate oral intake:  Yes  Pain has adequate pain control using Oral analgesics:  Yes     (1500 - 1900) C/O minimal pain. On scheduled tylenol. Offered pain medication but refused other than tylenol. Baseline numbness in both upper extremities. VSS on RA. Cont' monitor.

## 2022-02-04 NOTE — ANESTHESIA POSTPROCEDURE EVALUATION
Patient: Arnaldo Pacheco    Procedure: Procedure(s):  RIGHT REVERSE TOTAL SHOULDER       Diagnosis:Rotator cuff arthropathy of right shoulder [M12.811]  Diagnosis Additional Information: No value filed.    Anesthesia Type:  General    Note:     Postop Pain Control: Uneventful            Sign Out: Well controlled pain   PONV: No   Neuro/Psych: Uneventful            Sign Out: Acceptable/Baseline neuro status   Airway/Respiratory: Uneventful            Sign Out: Acceptable/Baseline resp. status   CV/Hemodynamics: Uneventful            Sign Out: Acceptable CV status   Other NRE: NONE   DID A NON-ROUTINE EVENT OCCUR? No           Last vitals:  Vitals Value Taken Time   /59 02/02/22 1430   Temp 36.4  C (97.5  F) 02/02/22 1340   Pulse 72 02/02/22 1437   Resp 8 02/02/22 1437   SpO2 96 % 02/02/22 1437   Vitals shown include unvalidated device data.    Electronically Signed By: Gato Huerta MD  February 4, 2022  10:58 AM

## 2022-02-09 NOTE — ADDENDUM NOTE
Addendum  created 02/09/22 0759 by Gato Huerta MD    Intraprocedure Event edited, Intraprocedure Staff edited

## 2022-03-04 DIAGNOSIS — M79.622 PAIN OF LEFT UPPER ARM: ICD-10-CM

## 2022-03-04 DIAGNOSIS — G62.9 NEUROPATHY: ICD-10-CM

## 2022-03-04 RX ORDER — GABAPENTIN 300 MG/1
CAPSULE ORAL
Qty: 30 CAPSULE | Refills: 0 | Status: SHIPPED | OUTPATIENT
Start: 2022-03-04 | End: 2022-04-11

## 2022-03-04 NOTE — TELEPHONE ENCOUNTER
Patient was seen here 1 time by Claudette Dumont, it looks like the intention was for him to follow-up with the [facility] where he gets his care and have them manage the medication.  We will give 1 more prescription for 30 days but then he needs [facility] this.

## 2022-03-04 NOTE — TELEPHONE ENCOUNTER
Routing refill request to provider for review/approval because:  Drug not on the [facility] refill protocol      Previous patient of Claudette Dumont, routed to covering provider Laina Sosa to review.     Radha Clement RN  [facility]

## 2022-03-04 NOTE — TELEPHONE ENCOUNTER
Patient called and he was informed of this one time refill and to [facility] gabapentin

## 2022-04-10 DIAGNOSIS — G62.9 NEUROPATHY: ICD-10-CM

## 2022-04-10 DIAGNOSIS — M79.622 PAIN OF LEFT UPPER ARM: ICD-10-CM

## 2022-04-11 RX ORDER — GABAPENTIN 300 MG/1
CAPSULE ORAL
Qty: 30 CAPSULE | Refills: 0 | Status: SHIPPED | OUTPATIENT
Start: 2022-04-11 | End: 2022-05-16

## 2022-04-11 NOTE — TELEPHONE ENCOUNTER
Routing refill request to provider for review/approval because:  Drug not on the [facility] refill protocol   Patient needs to be seen because:  needs to establish care with a provider.  Last office visit was 5 months ago with Tim    Routed to provider covering the letter P tello Dumont

## 2022-05-15 DIAGNOSIS — M79.622 PAIN OF LEFT UPPER ARM: ICD-10-CM

## 2022-05-15 DIAGNOSIS — G62.9 NEUROPATHY: ICD-10-CM

## 2022-05-16 RX ORDER — GABAPENTIN 300 MG/1
CAPSULE ORAL
Qty: 30 CAPSULE | Refills: 0 | Status: SHIPPED | OUTPATIENT
Start: 2022-05-16 | End: 2022-06-21

## 2022-05-16 NOTE — TELEPHONE ENCOUNTER
gabapentin (NEURONTIN) 300 MG capsule 30 capsule 0 4/11/2022  No   Sig: TAKE 1 CAPSULE(300 MG) BY MOUTH AT BEDTIME   Sent to pharmacy as: Gabapentin 300 MG Oral Capsule (NEURONTIN)   Class: E-Prescribe       Last office visit: 11/10/2021     Future Office Visit:        CSA -- Patient Level:    CSA: None found at the patient level.             Routing refill request to provider for review/approval because:  Drug not on the [facility] refill protocol     Lisa Daniels RN, BSN  [facility]

## 2022-06-18 DIAGNOSIS — M79.622 PAIN OF LEFT UPPER ARM: ICD-10-CM

## 2022-06-18 DIAGNOSIS — G62.9 NEUROPATHY: ICD-10-CM

## 2022-06-20 NOTE — TELEPHONE ENCOUNTER
Pending Prescriptions:                       Disp   Refills    gabapentin (NEURONTIN) 300 MG capsule [Pha*30 cap*0        Sig: TAKE 1 CAPSULE(300 MG) BY MOUTH AT BEDTIME    Routing refill request to provider for review/approval because:  Drug not on the [facility] refill protocol

## 2022-06-21 RX ORDER — GABAPENTIN 300 MG/1
CAPSULE ORAL
Qty: 30 CAPSULE | Refills: 0 | Status: SHIPPED | OUTPATIENT
Start: 2022-06-21 | End: 2022-08-30

## 2022-08-28 DIAGNOSIS — G62.9 NEUROPATHY: ICD-10-CM

## 2022-08-28 DIAGNOSIS — M79.622 PAIN OF LEFT UPPER ARM: ICD-10-CM

## 2022-08-30 RX ORDER — GABAPENTIN 300 MG/1
CAPSULE ORAL
Qty: 30 CAPSULE | Refills: 0 | Status: SHIPPED | OUTPATIENT
Start: 2022-08-30 | End: 2024-10-03

## 2022-10-05 DIAGNOSIS — M79.622 PAIN OF LEFT UPPER ARM: ICD-10-CM

## 2022-10-05 DIAGNOSIS — G62.9 NEUROPATHY: ICD-10-CM

## 2022-10-06 RX ORDER — GABAPENTIN 300 MG/1
CAPSULE ORAL
Qty: 30 CAPSULE | Refills: 0 | OUTPATIENT
Start: 2022-10-06

## 2022-10-06 NOTE — TELEPHONE ENCOUNTER
Routing refill request to provider for review/approval because:  Drug not on the [facility] refill protocol     Needs yearly -Team please call to schedule      Jade Moya RN

## 2024-03-04 ENCOUNTER — ANCILLARY PROCEDURE (OUTPATIENT)
Dept: GENERAL RADIOLOGY | Facility: CLINIC | Age: 79
End: 2024-03-04
Attending: PHYSICIAN ASSISTANT
Payer: COMMERCIAL

## 2024-03-04 ENCOUNTER — OFFICE VISIT (OUTPATIENT)
Dept: URGENT CARE | Facility: URGENT CARE | Age: 79
End: 2024-03-04
Payer: COMMERCIAL

## 2024-03-04 VITALS
HEART RATE: 68 BPM | SYSTOLIC BLOOD PRESSURE: 134 MMHG | OXYGEN SATURATION: 97 % | TEMPERATURE: 97.6 F | DIASTOLIC BLOOD PRESSURE: 76 MMHG | BODY MASS INDEX: 25.83 KG/M2 | RESPIRATION RATE: 14 BRPM | WEIGHT: 180 LBS

## 2024-03-04 DIAGNOSIS — M51.369 DDD (DEGENERATIVE DISC DISEASE), LUMBAR: ICD-10-CM

## 2024-03-04 DIAGNOSIS — M54.41 ACUTE RIGHT-SIDED LOW BACK PAIN WITH RIGHT-SIDED SCIATICA: Primary | ICD-10-CM

## 2024-03-04 PROCEDURE — 72100 X-RAY EXAM L-S SPINE 2/3 VWS: CPT | Mod: TC | Performed by: RADIOLOGY

## 2024-03-04 PROCEDURE — 99203 OFFICE O/P NEW LOW 30 MIN: CPT | Performed by: PHYSICIAN ASSISTANT

## 2024-03-04 RX ORDER — GABAPENTIN 300 MG/1
300 CAPSULE ORAL AT BEDTIME
Qty: 30 CAPSULE | Refills: 0 | Status: SHIPPED | OUTPATIENT
Start: 2024-03-04 | End: 2024-04-03

## 2024-03-04 RX ORDER — METHOCARBAMOL 500 MG/1
500 TABLET, FILM COATED ORAL 4 TIMES DAILY PRN
Qty: 28 TABLET | Refills: 0 | Status: SHIPPED | OUTPATIENT
Start: 2024-03-04 | End: 2024-03-11

## 2024-03-04 ASSESSMENT — ENCOUNTER SYMPTOMS: BACK PAIN: 1

## 2024-03-04 NOTE — PATIENT INSTRUCTIONS
Perform hot epsom salt soaks, light stretching, lidocaine patches as needed.   Follow up with PCP for gabapentin refill  The muscle relaxer (Robaxin) will help with muscle spasms

## 2024-03-04 NOTE — PROGRESS NOTES
Assessment & Plan           1. Acute right-sided low back pain with right-sided sciatica    - methocarbamol (ROBAXIN) 500 MG tablet; Take 1 tablet (500 mg) by mouth 4 times daily as needed for muscle spasms  Dispense: 28 tablet; Refill: 0  - XR Lumbar Spine 2/3 Views  - gabapentin (NEURONTIN) 300 MG capsule; Take 1 capsule (300 mg) by mouth at bedtime for 30 days  Dispense: 30 capsule; Refill: 0  -Acetaminophen recommended as needed for pain.    2. DDD (degenerative disc disease), lumbar     X ray lumbar spine shows DDD,  L4-L5 anterolisthesis per radiology report. [facility] pain.     Results for orders placed or performed in visit on 03/04/24   XR Lumbar Spine 2/3 Views     Status: None    Narrative    XR LUMBAR SPINE 2/3 VIEWS  3/4/2024 2:21 PM     HISTORY: Acute right-sided low back pain with right-sided sciatica    COMPARISON: None.       Impression    IMPRESSION: Grade 1 anterolisthesis of L4 on L5. Posterior element  degenerative changes. Multilevel loss of disc height. Pedicles are  intact. Sacrum and sacroiliac joints are unremarkable.     RANJIT LUI MD         SYSTEM ID:  [id number]       Patient Instructions   Perform hot epsom salt soaks, light stretching, lidocaine patches as needed.   Follow up with PCP for gabapentin refill  The muscle relaxer (Robaxin) will help with muscle spasms      Return for Follow up, PCP.    At the end of the encounter, I discussed results, diagnosis, medications. Discussed red flags for immediate return to clinic/ER, as well as indications for follow up if no improvement. Patient understood and agreed to plan. Patient was stable for discharge.    Jitendra Mcintosh is a 78 year old male who presents to clinic today for the following health issues:  Chief Complaint   Patient presents with    Pain     Nerve pain - starting right lower back, buttucks, down his leg and into his foot x 6 months on and off -- in the  past uses Gabapentin PRN- but its from 2023, also some OTC pain meds too     HPI    Patient reports low back pain which starts from right low back to posterior buttock, thigh and lower leg x 6 months on and off. He is here today because he bend over cleaning after his cats which exacerbated the pain. Symptoms are recurrent. He notes bending, walking, makes the pain worse. Sitting with the right leg raised up helps with the pain. He rates the pain at 7/10. Patient takes acetaminophen and ibuprofen which have helped. He has used gabapentin prn in past which helped. He had some left over at home  from one year ago and is wondering if he should be taking that. He denies bowel and bladder incontinence, saddle anesthesia. [facility]     Review of Systems   Musculoskeletal:  Positive for back pain.       Problem List:  2022-02: S/p reverse total shoulder arthroplasty  2016-07: Neck pain      Past Medical History:   Diagnosis Date    Chronic pain of both shoulders     rotator cuff with right shoulder replacement scheduled for 12/8/2021    Chronic pain of left knee     s/p left total knee replacement    Gastroesophageal reflux disease     Hyperlipidemia LDL goal <130     SSS (sick sinus syndrome) (H)        Social History     Tobacco Use    Smoking status: Never    Smokeless tobacco: Never   Substance Use Topics    Alcohol use: Yes     Comment: weekly           Objective    /76 (BP Location: Right arm, Patient Position: Chair, Cuff Size: Adult Regular)   Pulse 68   Temp 97.6  F (36.4  C) (Oral)   Resp 14   Wt 81.6 kg (180 lb)   SpO2 97%   BMI 25.83 kg/m    Physical Exam  Vitals and nursing note reviewed.   Constitutional:       Appearance: Normal appearance.   Cardiovascular:      Rate and Rhythm: Normal rate and regular rhythm.   Pulmonary:      Effort: Pulmonary effort is normal.      Breath sounds: Normal breath sounds.   Musculoskeletal:         General: Normal range of  motion.      Cervical back: Normal range of motion and neck supple.      Lumbar back: Tenderness present. Negative right straight leg raise test.        Back:    Skin:     General: Skin is warm and dry.      Findings: No rash.   Neurological:      General: No focal deficit present.      Mental Status: He is alert and oriented to person, place, and time.      Cranial Nerves: Cranial nerves 2-12 are intact.      Sensory: Sensation is intact.      Motor: Motor function is intact.      Gait: Gait is intact.      Deep Tendon Reflexes:      Reflex Scores:       Patellar reflexes are 2+ on the right side.       Achilles reflexes are 2+ on the right side.  Psychiatric:         Mood and Affect: Mood normal.         Behavior: Behavior normal.              Xuan Hoover PA-C

## 2024-10-03 ENCOUNTER — OFFICE VISIT (OUTPATIENT)
Dept: FAMILY MEDICINE | Facility: CLINIC | Age: 79
End: 2024-10-03
Payer: COMMERCIAL

## 2024-10-03 VITALS
OXYGEN SATURATION: 100 % | RESPIRATION RATE: 18 BRPM | WEIGHT: 189.5 LBS | HEART RATE: 67 BPM | BODY MASS INDEX: 28.07 KG/M2 | SYSTOLIC BLOOD PRESSURE: 157 MMHG | TEMPERATURE: 97.1 F | HEIGHT: 69 IN | DIASTOLIC BLOOD PRESSURE: 68 MMHG

## 2024-10-03 DIAGNOSIS — R10.13 ABDOMINAL PAIN, EPIGASTRIC: Primary | ICD-10-CM

## 2024-10-03 LAB
ERYTHROCYTE [DISTWIDTH] IN BLOOD BY AUTOMATED COUNT: 12.1 % (ref 10–15)
HCT VFR BLD AUTO: 45.4 % (ref 40–53)
HGB BLD-MCNC: 14.7 G/DL (ref 13.3–17.7)
MCH RBC QN AUTO: 31.4 PG (ref 26.5–33)
MCHC RBC AUTO-ENTMCNC: 32.4 G/DL (ref 31.5–36.5)
MCV RBC AUTO: 97 FL (ref 78–100)
PLATELET # BLD AUTO: 187 10E3/UL (ref 150–450)
RBC # BLD AUTO: 4.68 10E6/UL (ref 4.4–5.9)
WBC # BLD AUTO: 7.3 10E3/UL (ref 4–11)

## 2024-10-03 PROCEDURE — 85027 COMPLETE CBC AUTOMATED: CPT | Performed by: FAMILY MEDICINE

## 2024-10-03 PROCEDURE — 82150 ASSAY OF AMYLASE: CPT | Performed by: FAMILY MEDICINE

## 2024-10-03 PROCEDURE — 99214 OFFICE O/P EST MOD 30 MIN: CPT | Performed by: FAMILY MEDICINE

## 2024-10-03 PROCEDURE — 36415 COLL VENOUS BLD VENIPUNCTURE: CPT | Performed by: FAMILY MEDICINE

## 2024-10-03 PROCEDURE — 80053 COMPREHEN METABOLIC PANEL: CPT | Performed by: FAMILY MEDICINE

## 2024-10-03 RX ORDER — OMEPRAZOLE 40 MG/1
40 CAPSULE, DELAYED RELEASE ORAL DAILY
Qty: 30 CAPSULE | Refills: 0 | Status: SHIPPED | OUTPATIENT
Start: 2024-10-03 | End: 2024-10-30

## 2024-10-03 NOTE — PROGRESS NOTES
"  Assessment & Plan     (R10.13) Abdominal pain, epigastric  (primary encounter diagnosis)  Comment: Complaining of persistent fullness and epigastric pain .   Worst after eating   He feels full very quickly .   Symptoms fails to improve over 1 month  .     Recommend omeprazole 1 tab daily .   As symptoms progressive with no improvement , will obtain Lab and CT scan .  Differential include pancreatitis , gastritis  ,gall bladder .  Plan: CBC with platelets, Comprehensive metabolic         panel (BMP + Alb, Alk Phos, ALT, AST, Total.         Bili, TP), Amylase, CT Abdomen Pelvis w         Contrast, omeprazole (PRILOSEC) 40 MG DR         capsule                MED REC REQUIRED  Post Medication Reconciliation Status:   BMI  Estimated body mass index is 28.19 kg/m  as calculated from the following:    Height as of this encounter: 1.746 m (5' 8.75\").    Weight as of this encounter: 86 kg (189 lb 8 oz).   Weight management plan: Discussed healthy diet and exercise guidelines      Jitendra Mcintosh is a 79 year old, presenting for the following health issues:  Abdominal Pain        10/3/2024    10:05 AM   Additional Questions   Roomed by Mary BELTRE       Pain History:  When did you first notice your pain? 1 month   Have you seen anyone else for your pain? Yes - [facility]  How has your pain affected your ability to work? Not applicable  Where in your body do you have pain? Abdominal/Flank Pain  Onset/Duration: 1 month  Description:   Character: Dull ache and Fullness  Location: epigastric region  Radiation: None  Intensity: mild  Progression of Symptoms:  worsening  Accompanying Signs & Symptoms:  Fever/Chills: No  Gas/Bloating: YES  Nausea: No  Vomitting: No  Diarrhea: No  Constipation: No  Dysuria or Hematuria: No  History:   Trauma: No  Previous similar pain: No  Previous tests done: none  Precipitating factors:   Does the pain change with:     Food: YES- pain is right after eating    " "Bowel Movement: No    Urination: No   Other factors:  No  Therapies tried and outcome: None    Review of Systems  CONSTITUTIONAL: NEGATIVE for fever, chills, change in weight  INTEGUMENTARY/SKIN: NEGATIVE for worrisome rashes, moles or lesions  EYES: NEGATIVE for vision changes or irritation  ENT/MOUTH: NEGATIVE for ear, mouth and throat problems  RESP: NEGATIVE for significant cough or SOB  BREAST: NEGATIVE for masses, tenderness or discharge  CV: NEGATIVE for chest pain, palpitations or peripheral edema  GI: epigastric pain , gastritis .    : NEGATIVE for frequency, dysuria, or hematuria  MUSCULOSKELETAL: NEGATIVE for significant arthralgias or myalgia  NEURO: NEGATIVE for weakness, dizziness or paresthesias  ENDOCRINE: NEGATIVE for temperature intolerance, skin/hair changes  HEME: NEGATIVE for bleeding problems  PSYCHIATRIC: NEGATIVE for changes in mood or affect      Objective    BP (!) 152/71   Pulse 67   Temp 97.1  F (36.2  C) (Tympanic)   Resp 18   Ht 1.746 m (5' 8.75\")   Wt 86 kg (189 lb 8 oz)   SpO2 100%   BMI 28.19 kg/m    Body mass index is 28.19 kg/m .  Physical Exam  HENT:      Mouth/Throat:      Mouth: Mucous membranes are moist.   Eyes:      Pupils: Pupils are equal, round, and reactive to light.   Cardiovascular:      Rate and Rhythm: Normal rate.   Pulmonary:      Effort: Pulmonary effort is normal.   Abdominal:      General: Abdomen is flat.      Palpations: Abdomen is soft.      Comments: Mild epigastric tenderness on deep palpation .   Musculoskeletal:         General: Normal range of motion.   Skin:     General: Skin is warm.   Neurological:      General: No focal deficit present.   Psychiatric:         Mood and Affect: Mood normal.         Behavior: Behavior normal.                Signed Electronically by: Mariluz George MD    "

## 2024-10-03 NOTE — LETTER
October 7, 2024      Arnaldo Pacheco  [address] [address]        Dear ,    We are writing to inform you of your test results.    Your test results fall within the expected range(s) or remain unchanged from previous results.  Please continue with current treatment plan.    Resulted Orders   CBC with platelets   Result Value Ref Range    WBC Count 7.3 4.0 - 11.0 10e3/uL    RBC Count 4.68 4.40 - 5.90 10e6/uL    Hemoglobin 14.7 13.3 - 17.7 g/dL    Hematocrit 45.4 40.0 - 53.0 %    MCV 97 78 - 100 fL    MCH 31.4 26.5 - 33.0 pg    MCHC 32.4 31.5 - 36.5 g/dL    RDW 12.1 10.0 - 15.0 %    Platelet Count 187 150 - 450 10e3/uL   Comprehensive metabolic panel (BMP + Alb, Alk Phos, ALT, AST, Total. Bili, TP)   Result Value Ref Range    Sodium 141 135 - 145 mmol/L    Potassium 4.6 3.4 - 5.3 mmol/L    Carbon Dioxide (CO2) 25 22 - 29 mmol/L    Anion Gap 11 7 - 15 mmol/L    Urea Nitrogen 19.3 8.0 - 23.0 mg/dL    Creatinine 0.84 0.67 - 1.17 mg/dL    GFR Estimate 90 >60 mL/min/1.73m2      Comment:      eGFR calculated using 2021 CKD-EPI equation.    Calcium 9.1 8.8 - 10.4 mg/dL      Comment:      Reference intervals for this test were updated on 7/16/2024 to reflect our healthy population more accurately. There may be differences in the flagging of prior results with similar values performed with this method. Those prior results can be interpreted in the context of the updated reference intervals.    Chloride 105 98 - 107 mmol/L    Glucose 91 70 - 99 mg/dL    Alkaline Phosphatase 63 40 - 150 U/L    AST 27 0 - 45 U/L    ALT 23 0 - 70 U/L    Protein Total 6.9 6.4 - 8.3 g/dL    Albumin 4.2 3.5 - 5.2 g/dL    Bilirubin Total 1.3 (H) <=1.2 mg/dL   Amylase   Result Value Ref Range    Amylase 47 28 - 100 U/L       If you have any questions or concerns, please call the clinic at the number listed above.       Sincerely,      Mariluz George MD

## 2024-10-04 LAB
ALBUMIN SERPL BCG-MCNC: 4.2 G/DL (ref 3.5–5.2)
ALP SERPL-CCNC: 63 U/L (ref 40–150)
ALT SERPL W P-5'-P-CCNC: 23 U/L (ref 0–70)
AMYLASE SERPL-CCNC: 47 U/L (ref 28–100)
ANION GAP SERPL CALCULATED.3IONS-SCNC: 11 MMOL/L (ref 7–15)
AST SERPL W P-5'-P-CCNC: 27 U/L (ref 0–45)
BILIRUB SERPL-MCNC: 1.3 MG/DL
BUN SERPL-MCNC: 19.3 MG/DL (ref 8–23)
CALCIUM SERPL-MCNC: 9.1 MG/DL (ref 8.8–10.4)
CHLORIDE SERPL-SCNC: 105 MMOL/L (ref 98–107)
CREAT SERPL-MCNC: 0.84 MG/DL (ref 0.67–1.17)
EGFRCR SERPLBLD CKD-EPI 2021: 90 ML/MIN/1.73M2
GLUCOSE SERPL-MCNC: 91 MG/DL (ref 70–99)
HCO3 SERPL-SCNC: 25 MMOL/L (ref 22–29)
POTASSIUM SERPL-SCNC: 4.6 MMOL/L (ref 3.4–5.3)
PROT SERPL-MCNC: 6.9 G/DL (ref 6.4–8.3)
SODIUM SERPL-SCNC: 141 MMOL/L (ref 135–145)

## 2024-10-17 ENCOUNTER — HOSPITAL ENCOUNTER (OUTPATIENT)
Dept: CT IMAGING | Facility: CLINIC | Age: 79
Discharge: HOME | End: 2024-10-17
Attending: FAMILY MEDICINE | Admitting: FAMILY MEDICINE
Payer: COMMERCIAL

## 2024-10-17 DIAGNOSIS — R10.13 ABDOMINAL PAIN, EPIGASTRIC: ICD-10-CM

## 2024-10-17 PROCEDURE — 74177 CT ABD & PELVIS W/CONTRAST: CPT

## 2024-10-17 PROCEDURE — 250N000011 HC RX IP 250 OP 636: Performed by: FAMILY MEDICINE

## 2024-10-17 RX ORDER — IOPAMIDOL 755 MG/ML
100 INJECTION, SOLUTION INTRAVASCULAR ONCE
Status: COMPLETED | OUTPATIENT
Start: 2024-10-17 | End: 2024-10-17

## 2024-10-17 RX ADMIN — IOPAMIDOL 100 ML: 755 INJECTION, SOLUTION INTRAVENOUS at 07:49

## 2024-10-20 NOTE — PROGRESS NOTES
Team     Please inform patient   Abdominal CT Scan does not show any acute changes .     A parapelvic simple cyst in the right kidney  measuring 6.5 x 5.8 x 9.3 cm .    Cyst could be present for a long time , we have option to repeat renal ultrasound scan in 3 months or we have an option for a urology consult .    Thanks   Mariluz George MD.

## 2024-10-23 DIAGNOSIS — N28.1 RENAL CYST: Primary | ICD-10-CM

## 2024-10-30 DIAGNOSIS — R10.13 ABDOMINAL PAIN, EPIGASTRIC: ICD-10-CM

## 2024-10-30 RX ORDER — OMEPRAZOLE 40 MG/1
40 CAPSULE, DELAYED RELEASE ORAL DAILY
Qty: 90 CAPSULE | Refills: 0 | Status: SHIPPED | OUTPATIENT
Start: 2024-10-30

## 2024-11-03 ENCOUNTER — HEALTH MAINTENANCE LETTER (OUTPATIENT)
Age: 79
End: 2024-11-03

## 2025-03-11 ENCOUNTER — MYC MEDICAL ADVICE (OUTPATIENT)
Dept: FAMILY MEDICINE | Facility: CLINIC | Age: 80
End: 2025-03-11
Payer: COMMERCIAL

## 2025-03-11 NOTE — TELEPHONE ENCOUNTER
Patient Quality Outreach    Patient is due for the following:   Physical Preventive Adult Physical    Action(s) Taken:   No follow up needed at this time.    Type of outreach:    Sent [employer] message.    Questions for provider review:    None           Jennifer Roach, CMA

## 2025-04-01 ENCOUNTER — OFFICE VISIT (OUTPATIENT)
Dept: INTERNAL MEDICINE | Facility: CLINIC | Age: 80
End: 2025-04-01
Payer: COMMERCIAL

## 2025-04-01 VITALS
WEIGHT: 188.9 LBS | RESPIRATION RATE: 20 BRPM | TEMPERATURE: 97.6 F | SYSTOLIC BLOOD PRESSURE: 164 MMHG | BODY MASS INDEX: 27.98 KG/M2 | DIASTOLIC BLOOD PRESSURE: 66 MMHG | HEART RATE: 68 BPM | HEIGHT: 69 IN | OXYGEN SATURATION: 97 %

## 2025-04-01 DIAGNOSIS — R01.1 HEART MURMUR: Primary | ICD-10-CM

## 2025-04-01 DIAGNOSIS — Z96.651 STATUS POST TOTAL RIGHT KNEE REPLACEMENT: ICD-10-CM

## 2025-04-01 DIAGNOSIS — E78.5 HYPERLIPIDEMIA LDL GOAL <130: ICD-10-CM

## 2025-04-01 DIAGNOSIS — R42 DIZZINESS: ICD-10-CM

## 2025-04-01 DIAGNOSIS — I10 PRIMARY HYPERTENSION: ICD-10-CM

## 2025-04-01 LAB
ALBUMIN SERPL BCG-MCNC: 4.5 G/DL (ref 3.5–5.2)
ALP SERPL-CCNC: 67 U/L (ref 40–150)
ALT SERPL W P-5'-P-CCNC: 24 U/L (ref 0–70)
ANION GAP SERPL CALCULATED.3IONS-SCNC: 11 MMOL/L (ref 7–15)
AST SERPL W P-5'-P-CCNC: 32 U/L (ref 0–45)
BILIRUB SERPL-MCNC: 0.9 MG/DL
BUN SERPL-MCNC: 17.9 MG/DL (ref 8–23)
CALCIUM SERPL-MCNC: 9.4 MG/DL (ref 8.8–10.4)
CHLORIDE SERPL-SCNC: 105 MMOL/L (ref 98–107)
CREAT SERPL-MCNC: 0.82 MG/DL (ref 0.67–1.17)
EGFRCR SERPLBLD CKD-EPI 2021: 90 ML/MIN/1.73M2
ERYTHROCYTE [DISTWIDTH] IN BLOOD BY AUTOMATED COUNT: 12.7 % (ref 10–15)
GLUCOSE SERPL-MCNC: 91 MG/DL (ref 70–99)
HCO3 SERPL-SCNC: 24 MMOL/L (ref 22–29)
HCT VFR BLD AUTO: 43.6 % (ref 40–53)
HGB BLD-MCNC: 14.8 G/DL (ref 13.3–17.7)
MCH RBC QN AUTO: 30.6 PG (ref 26.5–33)
MCHC RBC AUTO-ENTMCNC: 33.9 G/DL (ref 31.5–36.5)
MCV RBC AUTO: 90 FL (ref 78–100)
PLATELET # BLD AUTO: 183 10E3/UL (ref 150–450)
POTASSIUM SERPL-SCNC: 4.2 MMOL/L (ref 3.4–5.3)
PROT SERPL-MCNC: 7.4 G/DL (ref 6.4–8.3)
RBC # BLD AUTO: 4.83 10E6/UL (ref 4.4–5.9)
SODIUM SERPL-SCNC: 140 MMOL/L (ref 135–145)
TSH SERPL DL<=0.005 MIU/L-ACNC: 2.84 UIU/ML (ref 0.3–4.2)
WBC # BLD AUTO: 6.6 10E3/UL (ref 4–11)

## 2025-04-01 PROCEDURE — 3078F DIAST BP <80 MM HG: CPT | Performed by: INTERNAL MEDICINE

## 2025-04-01 PROCEDURE — 3077F SYST BP >= 140 MM HG: CPT | Performed by: INTERNAL MEDICINE

## 2025-04-01 PROCEDURE — 36415 COLL VENOUS BLD VENIPUNCTURE: CPT | Performed by: INTERNAL MEDICINE

## 2025-04-01 PROCEDURE — 84443 ASSAY THYROID STIM HORMONE: CPT | Performed by: INTERNAL MEDICINE

## 2025-04-01 PROCEDURE — 99214 OFFICE O/P EST MOD 30 MIN: CPT | Performed by: INTERNAL MEDICINE

## 2025-04-01 PROCEDURE — 80053 COMPREHEN METABOLIC PANEL: CPT | Performed by: INTERNAL MEDICINE

## 2025-04-01 PROCEDURE — 85027 COMPLETE CBC AUTOMATED: CPT | Performed by: INTERNAL MEDICINE

## 2025-04-01 PROCEDURE — 1125F AMNT PAIN NOTED PAIN PRSNT: CPT | Performed by: INTERNAL MEDICINE

## 2025-04-01 RX ORDER — ACETAMINOPHEN 325 MG/1
325-650 TABLET ORAL EVERY 6 HOURS PRN
COMMUNITY

## 2025-04-01 ASSESSMENT — PAIN SCALES - GENERAL: PAINLEVEL_OUTOF10: MODERATE PAIN (4)

## 2025-04-01 NOTE — PROGRESS NOTES
"  Assessment & Plan     Heart murmur  Assess ECHO   - Echocardiogram Complete; Future    Dizziness  Assess for anemia, metabolic abnormality   - CBC with platelets  - Comprehensive metabolic panel (BMP + Alb, Alk Phos, ALT, AST, Total. Bili, TP)  - TSH with free T4 reflex    Primary hypertension  Monitor BP, keep low salt diet   - CBC with platelets  - Comprehensive metabolic panel (BMP + Alb, Alk Phos, ALT, AST, Total. Bili, TP)  - TSH with free T4 reflex    Status post total right knee replacement  Continue PT     Hyperlipidemia LDL goal <130  Continue statin           BMI  Estimated body mass index is 28.1 kg/m  as calculated from the following:    Height as of this encounter: 1.746 m (5' 8.75\").    Weight as of this encounter: 85.7 kg (188 lb 14.4 oz).         See Patient Instructions    Jitendra Mcintosh is a 79 year old, presenting for the following health issues:  RECHECK and Fatigue (Pt states he is more tired than usual and is unsure if it's from being less active the last 3 months or if something else is going on)        4/1/2025     7:15 AM   Additional Questions   Roomed by Ruthy REIS   Accompanied by n/a     HPI        Chief Complaint   Patient presents with    RECHECK    Fatigue     Pt states he is more tired than usual and is unsure if it's from being less active the last 3 months or if something else is going on     Presents with concerns for feeling tired, oozy after having right knee replacement surgery 3 months ago. Goes to PT, improving.   Knee still feels stiff and swollen. Walks with a cane. No SOB, CP, fevers.   Has had high BP. Not on treatment. Keeps low salt diet.   Has H/O hyperlipidemia. On medical treatment and diet. No side effects. No muscle weakness, myalgias or upset stomach.   Reviewed preventive measures. Up to date on prostate cancer and colon cancer screening. [facility]   Does not smoke. No alcohol. Sleeps well.         Review of Systems  Constitutional, HEENT, " "cardiovascular, pulmonary, gi and gu systems are negative, except as otherwise noted.      Objective    BP (!) 164/66 (BP Location: Left arm, Cuff Size: Adult Regular)   Pulse 68   Temp 97.6  F (36.4  C) (Tympanic)   Resp 20   Ht 1.746 m (5' 8.75\")   Wt 85.7 kg (188 lb 14.4 oz)   SpO2 97%   BMI 28.10 kg/m    Body mass index is 28.1 kg/m .  Physical Exam   GENERAL: alert and no distress  NECK: no adenopathy, no asymmetry, masses, or scars  RESP: lungs clear to auscultation - no rales, rhonchi or wheezes  CV: regular rate and rhythm,rare premature beats, normal S1 S2, no S3 or S4,3/6 systolic murmur, no click or rub, trace LE peripheral edema  ABDOMEN: soft, nontender, no hepatosplenomegaly, no masses and bowel sounds normal  MS: no gross musculoskeletal defects noted,right knee swelling, no redness, post TKA     Office Visit on 10/03/2024   Component Date Value Ref Range Status    WBC Count 10/03/2024 7.3  4.0 - 11.0 10e3/uL Final    RBC Count 10/03/2024 4.68  4.40 - 5.90 10e6/uL Final    Hemoglobin 10/03/2024 14.7  13.3 - 17.7 g/dL Final    Hematocrit 10/03/2024 45.4  40.0 - 53.0 % Final    MCV 10/03/2024 97  78 - 100 fL Final    MCH 10/03/2024 31.4  26.5 - 33.0 pg Final    MCHC 10/03/2024 32.4  31.5 - 36.5 g/dL Final    RDW 10/03/2024 12.1  10.0 - 15.0 % Final    Platelet Count 10/03/2024 187  150 - 450 10e3/uL Final    Sodium 10/03/2024 141  135 - 145 mmol/L Final    Potassium 10/03/2024 4.6  3.4 - 5.3 mmol/L Final    Carbon Dioxide (CO2) 10/03/2024 25  22 - 29 mmol/L Final    Anion Gap 10/03/2024 11  7 - 15 mmol/L Final    Urea Nitrogen 10/03/2024 19.3  8.0 - 23.0 mg/dL Final    Creatinine 10/03/2024 0.84  0.67 - 1.17 mg/dL Final    GFR Estimate 10/03/2024 90  >60 mL/min/1.73m2 Final    eGFR calculated using 2021 CKD-EPI equation.    Calcium 10/03/2024 9.1  8.8 - 10.4 mg/dL Final    Reference intervals for this test were updated on 7/16/2024 to reflect our healthy population more accurately. There may be " differences in the flagging of prior results with similar values performed with this method. Those prior results can be interpreted in the context of the updated reference intervals.    Chloride 10/03/2024 105  98 - 107 mmol/L Final    Glucose 10/03/2024 91  70 - 99 mg/dL Final    Alkaline Phosphatase 10/03/2024 63  40 - 150 U/L Final    AST 10/03/2024 27  0 - 45 U/L Final    ALT 10/03/2024 23  0 - 70 U/L Final    Protein Total 10/03/2024 6.9  6.4 - 8.3 g/dL Final    Albumin 10/03/2024 4.2  3.5 - 5.2 g/dL Final    Bilirubin Total 10/03/2024 1.3 (H)  <=1.2 mg/dL Final    Amylase 10/03/2024 47  28 - 100 U/L Final           Signed Electronically by: Levi Kelly MD

## 2025-04-24 ENCOUNTER — HOSPITAL ENCOUNTER (OUTPATIENT)
Dept: ULTRASOUND IMAGING | Facility: CLINIC | Age: 80
Discharge: HOME | End: 2025-04-24
Attending: FAMILY MEDICINE
Payer: COMMERCIAL

## 2025-04-24 DIAGNOSIS — N28.1 RENAL CYST: ICD-10-CM

## 2025-04-24 PROCEDURE — 76775 US EXAM ABDO BACK WALL LIM: CPT

## 2025-05-29 ENCOUNTER — HOSPITAL ENCOUNTER (OUTPATIENT)
Dept: CARDIOLOGY | Facility: CLINIC | Age: 80
End: 2025-05-29
Attending: INTERNAL MEDICINE
Payer: COMMERCIAL

## 2025-05-29 DIAGNOSIS — R01.1 HEART MURMUR: ICD-10-CM

## 2025-05-29 LAB — LVEF ECHO: NORMAL

## 2025-05-29 PROCEDURE — 93306 TTE W/DOPPLER COMPLETE: CPT

## 2025-05-30 ENCOUNTER — RESULTS FOLLOW-UP (OUTPATIENT)
Dept: INTERNAL MEDICINE | Facility: CLINIC | Age: 80
End: 2025-05-30
Payer: COMMERCIAL

## 2025-05-30 DIAGNOSIS — Q23.81 BICUSPID AORTIC VALVE: Primary | ICD-10-CM

## 2025-05-30 NOTE — TELEPHONE ENCOUNTER
Patient calls back.     Informed patient of results and plan. Patient verbalized understanding. Advised once provider places referral he should get a call to schedule.     FAHAD WHARTON RN on 5/30/2025 at 12:53 PM   [facility]

## 2025-06-03 ENCOUNTER — PATIENT OUTREACH (OUTPATIENT)
Dept: CARE COORDINATION | Facility: CLINIC | Age: 80
End: 2025-06-03
Payer: COMMERCIAL

## 2025-06-05 ENCOUNTER — PATIENT OUTREACH (OUTPATIENT)
Dept: CARE COORDINATION | Facility: CLINIC | Age: 80
End: 2025-06-05
Payer: COMMERCIAL

## 2025-06-05 ENCOUNTER — TELEPHONE (OUTPATIENT)
Dept: CARDIOLOGY | Facility: CLINIC | Age: 80
End: 2025-06-05
Payer: COMMERCIAL

## 2025-06-05 NOTE — TELEPHONE ENCOUNTER
Patient Contacted for the patient to call back and schedule the following:    Appointment type: NEW CARDIO  Provider: ANY GENERAL MD  Return date: NEXT AVAILABLE  Specialty phone number: [phone] OPT 1  Additional appointment(s) needed: N/A  Additonal Notes: REQUESTED SAYRA STOKES) FOR PROVIDER.

## 2025-06-05 NOTE — TELEPHONE ENCOUNTER
[facility] Call Center    Phone Message    May a detailed message be left on voicemail: yes     Reason for Call: Appointment Intake    Referring Provider Name:     Levi Kelly MD      Diagnosis and/or Symptoms: Valve Disease    Action Taken: Other: cardio    Travel Screening: Not Applicable    Thank you!  Specialty [facility] Center       Date of Service:

## (undated) DEVICE — NDL 19GA 1.5"

## (undated) DEVICE — DRSG AQUACEL AG 3.5X9.75" HYDROFIBER 412011

## (undated) DEVICE — GLOVE PROTEXIS BLUE W/NEU-THERA 6.5  2D73EB65

## (undated) DEVICE — SU MONOCRYL 4-0 PS-2 18" UND Y496G

## (undated) DEVICE — LINEN TOWEL PACK X5 5464

## (undated) DEVICE — SUCTION IRR SYSTEM W/O TIP INTERPULSE HANDPIECE 0210-100-000

## (undated) DEVICE — SU VICRYL 2-0 CT-2 27" UND J269H

## (undated) DEVICE — SU FIBERWIRE 2 38"  AR-7200

## (undated) DEVICE — DRAPE IOBAN INCISE 23X17" 6650EZ

## (undated) DEVICE — PACK OPEN SHOULDER SOP15OCFSC

## (undated) DEVICE — DECANTER BAG 2002S

## (undated) DEVICE — MANIFOLD NEPTUNE 4 PORT 700-20

## (undated) DEVICE — HOOD FLYTE W/PEELAWAY 408-800-100

## (undated) DEVICE — COVER TABLE POLY 65X90" 8186

## (undated) DEVICE — SUCTION TIP YANKAUER STR K87

## (undated) DEVICE — SU NDL MAYO TROCAR MED 217003

## (undated) DEVICE — DRAPE STERI TOWEL LG 1010

## (undated) DEVICE — ESU GROUND PAD UNIVERSAL W/O CORD

## (undated) DEVICE — GLOVE PROTEXIS BLUE W/NEU-THERA 8.5  2D73EB85

## (undated) DEVICE — SU ETHIBOND 2 V-37 4X30" MX69G

## (undated) DEVICE — DEVICE RETRIEVER HEWSON 71111579

## (undated) DEVICE — SOL WATER IRRIG 1000ML BOTTLE 2F7114

## (undated) DEVICE — SU VICRYL 0 CT-1 27" J340H

## (undated) DEVICE — GLOVE PROTEXIS POWDER FREE 6.5 ORTHOPEDIC 2D73ET65

## (undated) DEVICE — BONE CLEANING TIP INTERPULSE  0210-010-000

## (undated) DEVICE — PREP CHLORAPREP 26ML TINTED ORANGE  260815

## (undated) DEVICE — Device

## (undated) DEVICE — GLOVE PROTEXIS W/NEU-THERA 8.5  2D73TE85

## (undated) DEVICE — DRSG STERI STRIP 1/2X4" R1547

## (undated) RX ORDER — CEFAZOLIN SODIUM/WATER 2 G/20 ML
SYRINGE (ML) INTRAVENOUS
Status: DISPENSED
Start: 2022-02-02

## (undated) RX ORDER — FENTANYL CITRATE 0.05 MG/ML
INJECTION, SOLUTION INTRAMUSCULAR; INTRAVENOUS
Status: DISPENSED
Start: 2022-02-02

## (undated) RX ORDER — ONDANSETRON 2 MG/ML
INJECTION INTRAMUSCULAR; INTRAVENOUS
Status: DISPENSED
Start: 2022-02-02

## (undated) RX ORDER — DEXAMETHASONE SODIUM PHOSPHATE 4 MG/ML
INJECTION, SOLUTION INTRA-ARTICULAR; INTRALESIONAL; INTRAMUSCULAR; INTRAVENOUS; SOFT TISSUE
Status: DISPENSED
Start: 2022-02-02

## (undated) RX ORDER — PROPOFOL 10 MG/ML
INJECTION, EMULSION INTRAVENOUS
Status: DISPENSED
Start: 2022-02-02

## (undated) RX ORDER — VANCOMYCIN HYDROCHLORIDE 1 G/20ML
INJECTION, POWDER, LYOPHILIZED, FOR SOLUTION INTRAVENOUS
Status: DISPENSED
Start: 2022-02-02

## (undated) RX ORDER — NEOSTIGMINE METHYLSULFATE 1 MG/ML
VIAL (ML) INJECTION
Status: DISPENSED
Start: 2022-02-02

## (undated) RX ORDER — LIDOCAINE HYDROCHLORIDE 20 MG/ML
INJECTION, SOLUTION EPIDURAL; INFILTRATION; INTRACAUDAL; PERINEURAL
Status: DISPENSED
Start: 2022-02-02

## (undated) RX ORDER — FENTANYL CITRATE 50 UG/ML
INJECTION, SOLUTION INTRAMUSCULAR; INTRAVENOUS
Status: DISPENSED
Start: 2022-02-02

## (undated) RX ORDER — BUPIVACAINE HYDROCHLORIDE AND EPINEPHRINE 2.5; 5 MG/ML; UG/ML
INJECTION, SOLUTION EPIDURAL; INFILTRATION; INTRACAUDAL; PERINEURAL
Status: DISPENSED
Start: 2022-02-02

## (undated) RX ORDER — ACETAMINOPHEN 160 MG
TABLET,DISINTEGRATING ORAL
Status: DISPENSED
Start: 2022-02-02

## (undated) RX ORDER — HYDROMORPHONE HCL IN WATER/PF 6 MG/30 ML
PATIENT CONTROLLED ANALGESIA SYRINGE INTRAVENOUS
Status: DISPENSED
Start: 2022-02-02

## (undated) RX ORDER — GLYCOPYRROLATE 0.2 MG/ML
INJECTION, SOLUTION INTRAMUSCULAR; INTRAVENOUS
Status: DISPENSED
Start: 2022-02-02